# Patient Record
Sex: MALE | Race: WHITE | Employment: OTHER | URBAN - NONMETROPOLITAN AREA
[De-identification: names, ages, dates, MRNs, and addresses within clinical notes are randomized per-mention and may not be internally consistent; named-entity substitution may affect disease eponyms.]

---

## 2020-10-21 ENCOUNTER — HOSPITAL ENCOUNTER (EMERGENCY)
Age: 75
Discharge: HOME | End: 2020-10-21
Attending: EMERGENCY MEDICINE
Payer: COMMERCIAL

## 2020-10-21 VITALS
HEIGHT: 69 IN | RESPIRATION RATE: 16 BRPM | OXYGEN SATURATION: 98 % | DIASTOLIC BLOOD PRESSURE: 65 MMHG | WEIGHT: 248.1 LBS | TEMPERATURE: 98.4 F | BODY MASS INDEX: 36.75 KG/M2 | HEART RATE: 74 BPM | SYSTOLIC BLOOD PRESSURE: 111 MMHG

## 2020-10-21 DIAGNOSIS — R21 RASH AND OTHER NONSPECIFIC SKIN ERUPTION: Primary | ICD-10-CM

## 2020-10-21 PROCEDURE — 74011250636 HC RX REV CODE- 250/636: Performed by: EMERGENCY MEDICINE

## 2020-10-21 PROCEDURE — 99283 EMERGENCY DEPT VISIT LOW MDM: CPT

## 2020-10-21 PROCEDURE — 74011250637 HC RX REV CODE- 250/637: Performed by: EMERGENCY MEDICINE

## 2020-10-21 PROCEDURE — 96374 THER/PROPH/DIAG INJ IV PUSH: CPT

## 2020-10-21 RX ORDER — FAMOTIDINE 20 MG/1
20 TABLET, FILM COATED ORAL
Status: COMPLETED | OUTPATIENT
Start: 2020-10-21 | End: 2020-10-21

## 2020-10-21 RX ORDER — LOSARTAN POTASSIUM 25 MG/1
25 TABLET ORAL 2 TIMES DAILY
COMMUNITY

## 2020-10-21 RX ORDER — POTASSIUM CITRATE 15 MEQ/1
15 TABLET, EXTENDED RELEASE ORAL DAILY
COMMUNITY
End: 2022-05-12

## 2020-10-21 RX ORDER — HYDROCORTISONE 0.5 %
OINTMENT (GRAM) TOPICAL 3 TIMES DAILY
Qty: 1 TUBE | Refills: 0 | Status: SHIPPED | OUTPATIENT
Start: 2020-10-21 | End: 2020-10-31

## 2020-10-21 RX ORDER — LEVOTHYROXINE SODIUM 50 UG/1
50 TABLET ORAL
COMMUNITY
End: 2022-05-12 | Stop reason: SDUPTHER

## 2020-10-21 RX ORDER — CEPHALEXIN 500 MG/1
500 CAPSULE ORAL DAILY
COMMUNITY

## 2020-10-21 RX ORDER — ROSUVASTATIN CALCIUM 20 MG/1
20 TABLET, COATED ORAL DAILY
COMMUNITY
End: 2022-05-12 | Stop reason: SDUPTHER

## 2020-10-21 RX ORDER — CETIRIZINE HYDROCHLORIDE 10 MG/1
10 CAPSULE, LIQUID FILLED ORAL DAILY
Qty: 20 CAP | Refills: 0 | Status: SHIPPED | OUTPATIENT
Start: 2020-10-21 | End: 2022-02-23 | Stop reason: ALTCHOICE

## 2020-10-21 RX ORDER — ACETAMINOPHEN 160 MG/5ML
200 SUSPENSION, ORAL (FINAL DOSE FORM) ORAL DAILY
COMMUNITY

## 2020-10-21 RX ORDER — DIPHENHYDRAMINE HCL 25 MG
25 TABLET ORAL
Status: COMPLETED | OUTPATIENT
Start: 2020-10-21 | End: 2020-10-21

## 2020-10-21 RX ORDER — FAMOTIDINE 20 MG/1
20 TABLET, FILM COATED ORAL 2 TIMES DAILY
Qty: 20 TAB | Refills: 0 | Status: SHIPPED | OUTPATIENT
Start: 2020-10-21 | End: 2020-10-31

## 2020-10-21 RX ORDER — VALACYCLOVIR HYDROCHLORIDE 500 MG/1
500 TABLET, FILM COATED ORAL
COMMUNITY
End: 2022-05-27 | Stop reason: SDUPTHER

## 2020-10-21 RX ORDER — ESCITALOPRAM OXALATE 20 MG/1
20 TABLET ORAL DAILY
COMMUNITY
End: 2022-05-12 | Stop reason: SDUPTHER

## 2020-10-21 RX ORDER — GLUCOSAM/CHONDRO/HERB 149/HYAL 750-100 MG
1 TABLET ORAL DAILY
COMMUNITY

## 2020-10-21 RX ORDER — ASPIRIN 81 MG/1
81 TABLET ORAL DAILY
COMMUNITY

## 2020-10-21 RX ORDER — METFORMIN HYDROCHLORIDE 500 MG/1
500 TABLET ORAL 2 TIMES DAILY WITH MEALS
COMMUNITY
End: 2022-05-16 | Stop reason: SDUPTHER

## 2020-10-21 RX ORDER — COLESEVELAM 180 1/1
625 TABLET ORAL 2 TIMES DAILY WITH MEALS
COMMUNITY

## 2020-10-21 RX ORDER — PREGABALIN 100 MG/1
100 CAPSULE ORAL 3 TIMES DAILY
COMMUNITY
End: 2022-01-06 | Stop reason: ALTCHOICE

## 2020-10-21 RX ADMIN — METHYLPREDNISOLONE SODIUM SUCCINATE 125 MG: 125 INJECTION, POWDER, FOR SOLUTION INTRAMUSCULAR; INTRAVENOUS at 08:04

## 2020-10-21 RX ADMIN — FAMOTIDINE 20 MG: 20 TABLET, FILM COATED ORAL at 08:01

## 2020-10-21 RX ADMIN — DIPHENHYDRAMINE HYDROCHLORIDE 25 MG: 25 TABLET ORAL at 08:01

## 2020-10-21 NOTE — ED PROVIDER NOTES
Patient presents with complaint of rash on both sides with itching. He was exposed to pines yesterday. No shortness of breath. This was noticed this morning . No other complaints. Past Medical History:   Diagnosis Date    Diabetes ([address]     Hypercholesterolemia     Hypertension     Hypokalemia     Hypothyroidism     Kidney stones     Neuropathy     Prostatitis     Psychiatric disorder     depression/anxiety       Past Surgical History:   Procedure Laterality Date    HX CATARACT REMOVAL           History reviewed. No pertinent family history.     Social History     Socioeconomic History    Marital status: Not on file     Spouse name: Not on file    Number of children: Not on file    Years of education: Not on file    Highest education level: Not on file   Occupational History    Not on file   Social Needs    Financial resource strain: Not on file    Food insecurity     Worry: Not on file     Inability: Not on file    Transportation needs     Medical: Not on file     Non-medical: Not on file   Tobacco Use    Smoking status: Never Smoker    Smokeless tobacco: Never Used   Substance and Sexual Activity    Alcohol use: Not Currently    Drug use: Not Currently    Sexual activity: Not on file   Lifestyle    Physical activity     Days per week: Not on file     Minutes per session: Not on file    Stress: Not on file   Relationships    Social connections     Talks on phone: Not on file     Gets together: Not on file     Attends Jew service: Not on file     Active member of club or organization: Not on file     Attends meetings of clubs or organizations: Not on file     Relationship status: Not on file    Intimate partner violence     Fear of current or ex partner: Not on file     Emotionally abused: Not on file     Physically abused: Not on file     Forced sexual activity: Not on file   Other Topics Concern    Not on file   Social History Narrative    Not on file         ALLERGIES: Patient has no known allergies. Review of Systems   Constitutional: Negative. HENT: Negative. Eyes: Negative. Respiratory: Negative. Cardiovascular: Negative. Endocrine: Negative. Genitourinary: Negative. Musculoskeletal: Negative. Skin: Positive for rash. Allergic/Immunologic: Negative. Neurological: Negative. Hematological: Negative. Psychiatric/Behavioral: Negative. Vitals:    10/21/20 0716   BP: (!) 147/85   Pulse: 81   Resp: 16   Temp: 98.4 °F (36.9 °C)   SpO2: 98%   Weight: 112.5 kg (248 lb 1.6 oz)   Height: 5' 9\" (1.753 m)            Physical Exam  Vitals signs and nursing note reviewed. HENT:      Head: Normocephalic and atraumatic. Nose: Nose normal.      Mouth/Throat:      Mouth: Mucous membranes are moist.   Eyes:      Conjunctiva/sclera: Conjunctivae normal.      Pupils: Pupils are equal, round, and reactive to light. Neck:      Musculoskeletal: Normal range of motion and neck supple. Cardiovascular:      Rate and Rhythm: Normal rate and regular rhythm. Pulses: Normal pulses. Heart sounds: Normal heart sounds. Pulmonary:      Effort: Pulmonary effort is normal.      Breath sounds: Normal breath sounds. Abdominal:      General: Abdomen is flat. Bowel sounds are normal.      Palpations: Abdomen is soft. Musculoskeletal: Normal range of motion. Skin:     General: Skin is warm and dry. Findings: Rash present. Neurological:      General: No focal deficit present. Mental Status: He is oriented to person, place, and time. Mental status is at baseline.    Psychiatric:         Mood and Affect: Mood normal.         Behavior: Behavior normal.          MDM       Procedures

## 2021-10-03 ENCOUNTER — APPOINTMENT (OUTPATIENT)
Dept: CT IMAGING | Age: 76
End: 2021-10-03
Attending: EMERGENCY MEDICINE
Payer: COMMERCIAL

## 2021-10-03 ENCOUNTER — HOSPITAL ENCOUNTER (EMERGENCY)
Age: 76
Discharge: HOME | End: 2021-10-03
Attending: EMERGENCY MEDICINE
Payer: COMMERCIAL

## 2021-10-03 VITALS
RESPIRATION RATE: 18 BRPM | BODY MASS INDEX: 37.03 KG/M2 | HEART RATE: 68 BPM | TEMPERATURE: 97.9 F | SYSTOLIC BLOOD PRESSURE: 179 MMHG | WEIGHT: 250 LBS | HEIGHT: 69 IN | DIASTOLIC BLOOD PRESSURE: 93 MMHG | OXYGEN SATURATION: 98 %

## 2021-10-03 DIAGNOSIS — R10.9 ACUTE RIGHT FLANK PAIN: Primary | ICD-10-CM

## 2021-10-03 LAB
APPEARANCE UR: CLEAR
BACTERIA URNS QL MICRO: NEGATIVE /HPF
BILIRUB UR QL: NEGATIVE
COLOR UR: ABNORMAL
GLUCOSE UR STRIP.AUTO-MCNC: >1000 MG/DL
HGB UR QL STRIP: ABNORMAL
KETONES UR QL STRIP.AUTO: NEGATIVE MG/DL
LEUKOCYTE ESTERASE UR QL STRIP.AUTO: NEGATIVE
NITRITE UR QL STRIP.AUTO: NEGATIVE
PH UR STRIP: 5.5 [PH] (ref 5–8)
PROT UR STRIP-MCNC: NEGATIVE MG/DL
RBC #/AREA URNS HPF: NORMAL /HPF (ref 0–3)
SP GR UR REFRACTOMETRY: 1.02 (ref 1–1.03)
UROBILINOGEN UR QL STRIP.AUTO: 0.2 EU/DL (ref 0.2–1)
WBC URNS QL MICRO: NORMAL /HPF (ref 0–5)

## 2021-10-03 PROCEDURE — 81001 URINALYSIS AUTO W/SCOPE: CPT

## 2021-10-03 PROCEDURE — 74176 CT ABD & PELVIS W/O CONTRAST: CPT

## 2021-10-03 PROCEDURE — 99283 EMERGENCY DEPT VISIT LOW MDM: CPT

## 2021-10-03 PROCEDURE — 74011250637 HC RX REV CODE- 250/637: Performed by: EMERGENCY MEDICINE

## 2021-10-03 RX ORDER — TAMSULOSIN HYDROCHLORIDE 0.4 MG/1
0.4 CAPSULE ORAL ONCE
Status: COMPLETED | OUTPATIENT
Start: 2021-10-03 | End: 2021-10-03

## 2021-10-03 RX ORDER — IBUPROFEN 600 MG/1
600 TABLET ORAL
Status: COMPLETED | OUTPATIENT
Start: 2021-10-03 | End: 2021-10-03

## 2021-10-03 RX ORDER — TAMSULOSIN HYDROCHLORIDE 0.4 MG/1
0.4 CAPSULE ORAL DAILY
COMMUNITY
End: 2022-05-12

## 2021-10-03 RX ADMIN — TAMSULOSIN HYDROCHLORIDE 0.4 MG: 0.4 CAPSULE ORAL at 18:28

## 2021-10-03 RX ADMIN — IBUPROFEN 600 MG: 600 TABLET ORAL at 18:46

## 2021-10-03 NOTE — ED PROVIDER NOTES
EMERGENCY DEPARTMENT HISTORY AND PHYSICAL EXAM      Date: 10/3/2021  Patient Name: Chelsea Moser    History of Presenting Illness     Chief Complaint   Patient presents with    Flank Pain       History Provided By: Patient    HPI: Chelsea Moser, 78 y.o. male with a past medical history significant Previous kidney stones presents to the ED with cc of left flank pain for 2 days pain intensity 5/10 worsened with movement, patient stated he took his  Flomax today denies any fever chills no nausea vomiting diarrhea, declined pain medication at this time, patient came concerned today because his urine output had decreased    There are no other complaints, changes, or physical findings at this time. PCP: Ammon Polanco MD    No current facility-administered medications on file prior to encounter. Current Outpatient Medications on File Prior to Encounter   Medication Sig Dispense Refill    tamsulosin (FLOMAX) 0.4 mg capsule Take 0.4 mg by mouth daily.  insulin degludec (TRESIBA FLEXTOUCH U-100 SC) 30 Units by SubCUTAneous route nightly.  rosuvastatin (CRESTOR) 20 mg tablet Take 20 mg by mouth daily.  metFORMIN (GLUCOPHAGE) 500 mg tablet Take 500 mg by mouth two (2) times daily (with meals).  SITagliptin (Januvia) 100 mg tablet Take 100 mg by mouth daily.  escitalopram oxalate (LEXAPRO) 20 mg tablet Take 20 mg by mouth daily.  losartan (COZAAR) 25 mg tablet Take 25 mg by mouth two (2) times a day.  levothyroxine (SYNTHROID) 50 mcg tablet Take 50 mcg by mouth Daily (before breakfast).  pregabalin (LYRICA) 100 mg capsule Take 100 mg by mouth three (3) times daily.  dapagliflozin (Farxiga) 5 mg tab tablet Take 5 mg by mouth daily.  colesevelam (WELCHOL) 625 mg tablet Take 625 mg by mouth two (2) times daily (with meals).  potassium citrate (UROCIT-K) 15 mEq TbER tablet Take 15 mEq by mouth daily.       cephALEXin (KEFLEX) 500 mg capsule Take 500 mg by mouth daily.  valACYclovir (VALTREX) 500 mg tablet Take 500 mg by mouth two (2) times daily as needed.  coenzyme Q-10 (CO Q-10) 200 mg capsule Take 200 mg by mouth daily.  aspirin delayed-release 81 mg tablet Take 81 mg by mouth daily.  omega 3-DHA-EPA-fish oil (Fish Oil) 1,000 mg (120 mg-180 mg) capsule Take 1 Cap by mouth daily.  Cetirizine (ZyrTEC) 10 mg cap Take 10 mg by mouth daily. 20 Cap 0       Past History     Past Medical History:  Past Medical History:   Diagnosis Date    Diabetes [address] [address]     Hypercholesterolemia     Hypertension     Hypokalemia     Hypothyroidism     Kidney stones     Neuropathy     Prostatitis     Psychiatric disorder     depression/anxiety       Past Surgical History:  Past Surgical History:   Procedure Laterality Date    HX CATARACT REMOVAL         Family History:  No family history on file. Social History:  Social History     Tobacco Use    Smoking status: Never Smoker    Smokeless tobacco: Never Used   Substance Use Topics    Alcohol use: Not Currently    Drug use: Not Currently       Allergies:  No Known Allergies      Review of Systems     Review of Systems   Constitutional: Negative for chills and fever. HENT: Negative for rhinorrhea and sore throat. Eyes: Negative for pain and visual disturbance. Respiratory: Negative for cough and wheezing. Cardiovascular: Negative for chest pain and leg swelling. Gastrointestinal: Negative for abdominal pain and vomiting. Endocrine: Negative for polydipsia and polyuria. Genitourinary: Positive for decreased urine volume, difficulty urinating and flank pain. Musculoskeletal: Negative for back pain and neck pain. Skin: Negative for color change and pallor. Neurological: Negative for weakness and numbness. Psychiatric/Behavioral: Negative. Physical Exam     Physical Exam  Vitals and nursing note reviewed. Constitutional:       General: He is not in acute distress. Appearance: Normal appearance. He is not ill-appearing, toxic-appearing or diaphoretic. HENT:      Head: Normocephalic and atraumatic. Mouth/Throat:      Mouth: Mucous membranes are moist.      Pharynx: Oropharynx is clear. Eyes:      Conjunctiva/sclera: Conjunctivae normal.      Pupils: Pupils are equal, round, and reactive to light. Cardiovascular:      Rate and Rhythm: Normal rate and regular rhythm. Pulses: Normal pulses. Heart sounds: Normal heart sounds. Pulmonary:      Effort: Pulmonary effort is normal.      Breath sounds: Normal breath sounds. Abdominal:      General: Bowel sounds are normal.      Palpations: Abdomen is soft. Tenderness: There is no right CVA tenderness or left CVA tenderness. Musculoskeletal:         General: Tenderness present. No swelling, deformity or signs of injury. Cervical back: Normal, normal range of motion and neck supple. Thoracic back: Normal.      Lumbar back: Tenderness present. No deformity or bony tenderness. Back:    Skin:     General: Skin is warm and dry. Capillary Refill: Capillary refill takes less than 2 seconds. Neurological:      General: No focal deficit present. Mental Status: He is alert and oriented to person, place, and time. Psychiatric:         Mood and Affect: Mood normal.         Behavior: Behavior normal.         Lab and Diagnostic Study Results     Labs -   No results found for this or any previous visit (from the past 12 hour(s)). Radiologic Studies -   @lastxrresult@  CT Results  (Last 48 hours)    None        CXR Results  (Last 48 hours)    None            Medical Decision Making   - I am the first provider for this patient. - I reviewed the vital signs, available nursing notes, past medical history, past surgical history, family history and social history. - Initial assessment performed.  The patients presenting problems have been discussed, and they are in agreement with the care plan formulated and outlined with them. I have encouraged them to ask questions as they arise throughout their visit. Vital Signs-Reviewed the patient's vital signs. Patient Vitals for the past 12 hrs:   Temp Pulse Resp BP SpO2   10/03/21 1753 97.9 °F (36.6 °C) 68 18 (!) 179/93 99 %       Records Reviewed: Nursing Notes    The patient presents with back pain with a differential diagnosis of  kidney stone, lumbar strain, pneumonia and traumatic injury      ED Course:     ED Course as of Oct 03 1840   Sun Oct 03, 2021   1835 Patient also noted he has been doing some moving lifting heavy boxes over the last 3 days and he is on an extended regimen of Keflex that he takes daily    [SB]      ED Course User Index  [SB] Shawnee Benson MD       Provider Notes (Medical Decision Making): MDM       Procedures   Medical Decision Makingedical Decision Making  Performed by: Cathy Ontiveros MD  PROCEDURES:  Procedures       Disposition   Disposition: Condition stable and improved  DC- Adult Discharges: All of the diagnostic tests were reviewed and questions answered. Diagnosis, care plan and treatment options were discussed. The patient understands the instructions and will follow up as directed. The patients results have been reviewed with them. They have been counseled regarding their diagnosis. The patient verbally convey understanding and agreement of the signs, symptoms, diagnosis, treatment and prognosis and additionally agrees to follow up as recommended with their PCP in 24 - 48 hours. They also agree with the care-plan and convey that all of their questions have been answered.   I have also put together some discharge instructions for them that include: 1) educational information regarding their diagnosis, 2) how to care for their diagnosis at home, as well a 3) list of reasons why they would want to return to the ED prior to their follow-up appointment, should their condition change. DISCHARGE PLAN:  1. Current Discharge Medication List      CONTINUE these medications which have NOT CHANGED    Details   tamsulosin (FLOMAX) 0.4 mg capsule Take 0.4 mg by mouth daily. insulin degludec (TRESIBA FLEXTOUCH U-100 SC) 30 Units by SubCUTAneous route nightly. rosuvastatin (CRESTOR) 20 mg tablet Take 20 mg by mouth daily. metFORMIN (GLUCOPHAGE) 500 mg tablet Take 500 mg by mouth two (2) times daily (with meals). SITagliptin (Januvia) 100 mg tablet Take 100 mg by mouth daily. escitalopram oxalate (LEXAPRO) 20 mg tablet Take 20 mg by mouth daily. losartan (COZAAR) 25 mg tablet Take 25 mg by mouth two (2) times a day. levothyroxine (SYNTHROID) 50 mcg tablet Take 50 mcg by mouth Daily (before breakfast). pregabalin (LYRICA) 100 mg capsule Take 100 mg by mouth three (3) times daily. dapagliflozin (Farxiga) 5 mg tab tablet Take 5 mg by mouth daily. [facility]) 625 mg tablet Take 625 mg by mouth two (2) times daily (with meals). potassium citrate (UROCIT-K) 15 mEq TbER tablet Take 15 mEq by mouth daily. cephALEXin (KEFLEX) 500 mg capsule Take 500 mg by mouth daily. valACYclovir (VALTREX) 500 mg tablet Take 500 mg by mouth two (2) times daily as needed. coenzyme Q-10 (CO Q-10) 200 mg capsule Take 200 mg by mouth daily. aspirin delayed-release 81 mg tablet Take 81 mg by mouth daily. omega 3-DHA-EPA-fish oil (Fish Oil) 1,000 mg (120 mg-180 mg) capsule Take 1 Cap by mouth daily. Cetirizine (ZyrTEC) 10 mg cap Take 10 mg by mouth daily. Qty: 20 Cap, Refills: 0           2. Follow-up Information    None       3. Return to ED if worse   4. Current Discharge Medication List            Diagnosis     Clinical Impression: No diagnosis found. Attestations:    Norman Flores MD    Please note that this dictation was completed with [employer], the [employer] voice recognition software.   Quite often unanticipated grammatical, syntax, homophones, and other interpretive errors are inadvertently transcribed by the computer software. Please disregard these errors. Please excuse any errors that have escaped final proofreading. Thank you.

## 2021-10-03 NOTE — ED TRIAGE NOTES
Reports left flank pain that has been going on for 2 days, reports pain is worse when he is up moving around. Reports hx of kidney stones. Reports urine appears clear.

## 2022-01-06 ENCOUNTER — OFFICE VISIT (OUTPATIENT)
Dept: PRIMARY CARE CLINIC | Age: 77
End: 2022-01-06
Payer: COMMERCIAL

## 2022-01-06 VITALS
SYSTOLIC BLOOD PRESSURE: 127 MMHG | RESPIRATION RATE: 18 BRPM | BODY MASS INDEX: 36.18 KG/M2 | WEIGHT: 245 LBS | OXYGEN SATURATION: 98 % | TEMPERATURE: 97.2 F | DIASTOLIC BLOOD PRESSURE: 70 MMHG | HEART RATE: 82 BPM

## 2022-01-06 DIAGNOSIS — N40.0 BENIGN PROSTATIC HYPERPLASIA WITHOUT LOWER URINARY TRACT SYMPTOMS: ICD-10-CM

## 2022-01-06 DIAGNOSIS — E03.9 ACQUIRED HYPOTHYROIDISM: ICD-10-CM

## 2022-01-06 DIAGNOSIS — Z79.4 TYPE 2 DIABETES MELLITUS WITH DIABETIC NEUROPATHY, WITH LONG-TERM CURRENT USE OF INSULIN (HCC): Primary | ICD-10-CM

## 2022-01-06 DIAGNOSIS — E11.40 TYPE 2 DIABETES MELLITUS WITH DIABETIC NEUROPATHY, WITH LONG-TERM CURRENT USE OF INSULIN (HCC): Primary | ICD-10-CM

## 2022-01-06 DIAGNOSIS — F41.9 ANXIETY AND DEPRESSION: ICD-10-CM

## 2022-01-06 DIAGNOSIS — G62.9 NEUROPATHY: ICD-10-CM

## 2022-01-06 DIAGNOSIS — E11.69 HYPERLIPIDEMIA DUE TO TYPE 2 DIABETES MELLITUS (HCC): ICD-10-CM

## 2022-01-06 DIAGNOSIS — Z86.79 HISTORY OF HEART DISEASE: ICD-10-CM

## 2022-01-06 DIAGNOSIS — E66.01 SEVERE OBESITY (BMI 35.0-35.9 WITH COMORBIDITY) (HCC): ICD-10-CM

## 2022-01-06 DIAGNOSIS — B00.9 HERPES: ICD-10-CM

## 2022-01-06 DIAGNOSIS — Z86.010 HISTORY OF COLON POLYPS: ICD-10-CM

## 2022-01-06 DIAGNOSIS — E78.5 HYPERLIPIDEMIA DUE TO TYPE 2 DIABETES MELLITUS (HCC): ICD-10-CM

## 2022-01-06 DIAGNOSIS — F32.A ANXIETY AND DEPRESSION: ICD-10-CM

## 2022-01-06 PROBLEM — N12 PYELONEPHRITIS: Status: ACTIVE | Noted: 2018-12-25

## 2022-01-06 PROBLEM — I10 ESSENTIAL HYPERTENSION: Status: ACTIVE | Noted: 2018-12-26

## 2022-01-06 PROBLEM — E11.9 DM (DIABETES MELLITUS) (HCC): Status: ACTIVE | Noted: 2018-12-26

## 2022-01-06 PROBLEM — N20.0 KIDNEY STONES: Status: ACTIVE | Noted: 2020-08-24

## 2022-01-06 PROBLEM — N12 PYELONEPHRITIS: Status: RESOLVED | Noted: 2018-12-25 | Resolved: 2022-01-06

## 2022-01-06 PROBLEM — E78.2 MIXED HYPERLIPIDEMIA: Status: ACTIVE | Noted: 2020-08-24

## 2022-01-06 LAB — HBA1C MFR BLD HPLC: 7.5 %

## 2022-01-06 PROCEDURE — 83036 HEMOGLOBIN GLYCOSYLATED A1C: CPT | Performed by: NURSE PRACTITIONER

## 2022-01-06 PROCEDURE — 99214 OFFICE O/P EST MOD 30 MIN: CPT | Performed by: NURSE PRACTITIONER

## 2022-01-06 PROCEDURE — 3051F HG A1C>EQUAL 7.0%<8.0%: CPT | Performed by: NURSE PRACTITIONER

## 2022-01-06 RX ORDER — PREGABALIN 200 MG/1
200 CAPSULE ORAL 3 TIMES DAILY
Qty: 270 CAPSULE | Refills: 0 | Status: SHIPPED | OUTPATIENT
Start: 2022-01-06 | End: 2022-02-23

## 2022-01-06 RX ORDER — PREGABALIN 200 MG/1
400 CAPSULE ORAL 3 TIMES DAILY
Status: CANCELLED | OUTPATIENT
Start: 2022-01-06

## 2022-01-06 RX ORDER — INSULIN DEGLUDEC INJECTION 100 U/ML
30 INJECTION, SOLUTION SUBCUTANEOUS
Qty: 27 ML | Refills: 0 | Status: SHIPPED | OUTPATIENT
Start: 2022-01-06 | End: 2022-03-27

## 2022-01-06 RX ORDER — PREGABALIN 200 MG/1
400 CAPSULE ORAL 3 TIMES DAILY
COMMUNITY
End: 2022-01-06 | Stop reason: SDUPTHER

## 2022-01-06 RX ORDER — METOPROLOL SUCCINATE 25 MG/1
TABLET, EXTENDED RELEASE ORAL
COMMUNITY
Start: 2021-11-10

## 2022-01-06 RX ORDER — PEN NEEDLE, DIABETIC 31 GX5/16"
NEEDLE, DISPOSABLE MISCELLANEOUS
COMMUNITY
Start: 2021-11-10 | End: 2022-05-16 | Stop reason: SDUPTHER

## 2022-01-06 RX ORDER — INSULIN DEGLUDEC INJECTION 100 U/ML
INJECTION, SOLUTION SUBCUTANEOUS
Status: CANCELLED | OUTPATIENT
Start: 2022-01-06

## 2022-01-06 RX ORDER — INSULIN GLARGINE 100 [IU]/ML
30 INJECTION, SOLUTION SUBCUTANEOUS AT BEDTIME
COMMUNITY
Start: 2021-11-10 | End: 2022-01-06 | Stop reason: ALTCHOICE

## 2022-01-06 NOTE — PROGRESS NOTES
Chief Complaint   Patient presents with    Diabetes    Hypertension     Pt just moved here need snew referrals

## 2022-01-06 NOTE — PROGRESS NOTES
Alysha Rivas is a 78 y.o. male who presents to the office today for the following:    Chief Complaint   Patient presents with    Diabetes    Hypertension       Past Medical History:   Diagnosis Date    Diabetes ([address]     Hypercholesterolemia     Hypertension     Hypokalemia     Hypothyroidism     Kidney stones     Neuropathy     Prostatitis     Psychiatric disorder     depression/anxiety       Past Surgical History:   Procedure Laterality Date    HX CATARACT REMOVAL      HX LITHOTRIPSY          History reviewed. No pertinent family history. Social History     Tobacco Use    Smoking status: Never Smoker    Smokeless tobacco: Never Used   Substance Use Topics    Alcohol use: Not Currently    Drug use: Not Currently        HPI  Patient here today as new patient to establish care with [facility] of type 2 diabetes, hyperlipidemia, heart disease, BPH, neuropathy, hypothyroidism, herpes,anxiety/depression,colon polyps and obesity. Reports he recently moved to area. Was following with multiple specialist including urologist and cardiologist. Denies any new concerns today but does need refills on few medications and labwork. Current Outpatient Medications on File Prior to Visit   Medication Sig    BD Ultra-Fine Short Pen Needle 31 gauge x 5/16\" ndle     metoprolol succinate (TOPROL-XL) 25 mg XL tablet     tamsulosin (FLOMAX) 0.4 mg capsule Take 0.4 mg by mouth daily.  rosuvastatin (CRESTOR) 20 mg tablet Take 20 mg by mouth daily.  metFORMIN (GLUCOPHAGE) 500 mg tablet Take 500 mg by mouth two (2) times daily (with meals).  SITagliptin (Januvia) 100 mg tablet Take 100 mg by mouth daily.  escitalopram oxalate (LEXAPRO) 20 mg tablet Take 20 mg by mouth daily.  losartan (COZAAR) 25 mg tablet Take 25 mg by mouth two (2) times a day.  levothyroxine (SYNTHROID) 50 mcg tablet Take 50 mcg by mouth Daily (before breakfast).     colesevelam (WELCHOL) 625 mg tablet Take 625 mg by mouth two (2) times daily (with meals).  potassium citrate (UROCIT-K) 15 mEq TbER tablet Take 15 mEq by mouth daily.  cephALEXin (KEFLEX) 500 mg capsule Take 500 mg by mouth daily.  valACYclovir (VALTREX) 500 mg tablet Take 500 mg by mouth two (2) times daily as needed.  coenzyme Q-10 (CO Q-10) 200 mg capsule Take 200 mg by mouth daily.  aspirin delayed-release 81 mg tablet Take 81 mg by mouth daily.  omega 3-DHA-EPA-fish oil (Fish Oil) 1,000 mg (120 mg-180 mg) capsule Take 1 Capsule by mouth daily. OTC    Cetirizine (ZyrTEC) 10 mg cap Take 10 mg by mouth daily. (Patient not taking: Reported on 2022)     No current facility-administered medications on file prior to visit. Medications Ordered Today   Medications    dapagliflozin (Farxiga) 5 mg tab tablet     Sig: Take 1 Tablet by mouth daily. Dispense:  90 Tablet     Refill:  0    pregabalin (LYRICA) 200 mg capsule     Sig: Take 1 Capsule by mouth three (3) times daily for 90 days. Max Daily Amount: 600 mg. Dispense:  270 Capsule     Refill:  0    insulin degludec Durwin Alejandra FlexTouch U-100) 100 unit/mL (3 mL) inpn     Si Units by SubCUTAneous route nightly for 90 days. Dispense:  27 mL     Refill:  0        Review of Systems   Constitutional: Negative. HENT: Negative. Eyes: Negative. Respiratory: Negative. Cardiovascular: Negative. Gastrointestinal: Negative. Genitourinary: Negative. Musculoskeletal: Positive for myalgias. Skin: Negative. Neurological: Positive for tingling. Negative for dizziness, focal weakness, seizures, weakness and headaches. Psychiatric/Behavioral: Negative. Visit Vitals  /70 (BP 1 Location: Left upper arm, BP Patient Position: Sitting, BP Cuff Size: Adult)   Pulse 82   Temp 97.2 °F (36.2 °C) (Temporal)   Resp 18   Wt 245 lb (111.1 kg)   SpO2 98%   BMI 36.18 kg/m²       Physical Exam  Vitals and nursing note reviewed.    Constitutional:       Appearance: Normal appearance. He is obese. Eyes:      Pupils: Pupils are equal, round, and reactive to light. Neck:      Vascular: No carotid bruit. Cardiovascular:      Rate and Rhythm: Normal rate and regular rhythm. Pulses: Normal pulses. Pulmonary:      Effort: Pulmonary effort is normal.      Breath sounds: Normal breath sounds. Abdominal:      General: Bowel sounds are normal.      Palpations: Abdomen is soft. Tenderness: There is no abdominal tenderness. There is no guarding. Musculoskeletal:         General: Normal range of motion. Right lower leg: No edema. Left lower leg: No edema. Lymphadenopathy:      Cervical: No cervical adenopathy. Skin:     General: Skin is warm and dry. Neurological:      Mental Status: He is alert and oriented to person, place, and time. Mental status is at baseline. 1. Type 2 diabetes mellitus with diabetic neuropathy, with long-term current use of insulin ([facility])  Last Point of Care HGB A1C  Hemoglobin A1c (POC)   Date Value Ref Range Status   01/06/2022 7.5 % Preliminary      Sugars are controlled   On Farxiga 5mg daily, Januvia 100mg daily, metformin 500mg twice daily (highest tolerated dose) and tresiba 30 units daily  Did not bring meter but reports fastings under 150  Up to date on diabetic eye exam per patient  Will check fasting labs  Diabetic diet encouraged along with getting regular exercise 3-5 times weekly for 30-45 minutes  - dapagliflozin (Farxiga) 5 mg tab tablet; Take 1 Tablet by mouth daily. Dispense: 90 Tablet; Refill: 0  - AMB POC HEMOGLOBIN A1C  - CBC WITH AUTOMATED DIFF  - METABOLIC PANEL, COMPREHENSIVE  - URINALYSIS W/ RFLX MICROSCOPIC  - MICROALBUMIN, UR, RAND W/ MICROALB/CREAT RATIO  - LIPID PANEL  - TSH RFX ON ABNORMAL TO FREE T4  - pregabalin (LYRICA) 200 mg capsule; Take 1 Capsule by mouth three (3) times daily for 90 days. Max Daily Amount: 600 mg. Dispense: 270 Capsule;  Refill: 0  - insulin degludec Rico Wheatleyuch U-100) 100 unit/mL (3 mL) inpn; 30 Units by SubCUTAneous route nightly for 90 days. Dispense: 27 mL; Refill: 0    2. Severe obesity (BMI 35.0-35.9 with comorbidity) ([facility] [address]  Continue to encourage weight loss with diet and exercise as discussed above    3. Hyperlipidemia due to type 2 diabetes mellitus ([facility] [address]  Has been stable on crestor 20mg daily    4. Benign prostatic hyperplasia without lower urinary tract symptoms  On flomax but does still have some urinary symptoms  Checking PSA  Will send referral to establish care with new urologist  - PSA W/ REFLX FREE PSA  - REFERRAL TO UROLOGY    5. Herpes  Stable and on valtrex prophylaxis    6. Anxiety and depression  Stable and on lexapro as directed    7. History of colon polyps    - REFERRAL TO GASTROENTEROLOGY  - COLONOSCOPY; Future    8. History of heart disease  He reports h/o cardiac problem but no records available today  Will send referral to establish care with local cardiologist and requested for him to sign release for prior records. - REFERRAL TO CARDIOLOGY    9. Neuropathy  Continue on lyrica as directed for pain associated with neuropathy in hands and feet  dvised do not drive or operate machinery if experiencing drowsiness or otherwise feel impaired while taking medication. ORT score 2 and low risk   Controlled substance agreement discussed and signed   Understands proper use, storage and disposal of medication   Controlled Substance Monitoring:    RX Monitoring 1/6/2022   Periodic Controlled Substance Monitoring Possible medication side effects, risk of tolerance/dependence & alternative treatments discussed. ;No signs of potential drug abuse or diversion identified. 10.Hypothyroidism  On levothyroxine 50mcq daily   Repeat thyroid functions with labs    Patient verbalizes understanding of plan of care as discussed above    Follow-up and Dispositions    · Return in about 6 months (around 7/6/2022) for or sooner for worsening symptoms.

## 2022-01-08 LAB
ALBUMIN SERPL-MCNC: 4.7 G/DL (ref 3.7–4.7)
ALBUMIN/CREAT UR: 74 MG/G CREAT (ref 0–29)
ALBUMIN/GLOB SERPL: 2.1 {RATIO} (ref 1.2–2.2)
ALP SERPL-CCNC: 57 IU/L (ref 44–121)
ALT SERPL-CCNC: 18 IU/L (ref 0–44)
APPEARANCE UR: CLEAR
AST SERPL-CCNC: 12 IU/L (ref 0–40)
BACTERIA #/AREA URNS HPF: NORMAL /[HPF]
BASOPHILS # BLD AUTO: 0.1 X10E3/UL (ref 0–0.2)
BASOPHILS NFR BLD AUTO: 1 %
BILIRUB SERPL-MCNC: 1 MG/DL (ref 0–1.2)
BILIRUB UR QL STRIP: NEGATIVE
BUN SERPL-MCNC: 16 MG/DL (ref 8–27)
BUN/CREAT SERPL: 13 (ref 10–24)
CALCIUM SERPL-MCNC: 9.5 MG/DL (ref 8.6–10.2)
CASTS URNS QL MICRO: NORMAL /LPF
CHLORIDE SERPL-SCNC: 101 MMOL/L (ref 96–106)
CHOLEST SERPL-MCNC: 78 MG/DL (ref 100–199)
CO2 SERPL-SCNC: 23 MMOL/L (ref 20–29)
COLOR UR: YELLOW
CREAT SERPL-MCNC: 1.2 MG/DL (ref 0.76–1.27)
CREAT UR-MCNC: 154.1 MG/DL
EOSINOPHIL # BLD AUTO: 0.2 X10E3/UL (ref 0–0.4)
EOSINOPHIL NFR BLD AUTO: 3 %
EPI CELLS #/AREA URNS HPF: NORMAL /HPF (ref 0–10)
ERYTHROCYTE [DISTWIDTH] IN BLOOD BY AUTOMATED COUNT: 13.3 % (ref 11.6–15.4)
GLOBULIN SER CALC-MCNC: 2.2 G/DL (ref 1.5–4.5)
GLUCOSE SERPL-MCNC: 112 MG/DL (ref 65–99)
GLUCOSE UR QL: NEGATIVE
HCT VFR BLD AUTO: 49.1 % (ref 37.5–51)
HDLC SERPL-MCNC: 35 MG/DL
HGB BLD-MCNC: 16.7 G/DL (ref 13–17.7)
HGB UR QL STRIP: ABNORMAL
IMM GRANULOCYTES # BLD AUTO: 0 X10E3/UL (ref 0–0.1)
IMM GRANULOCYTES NFR BLD AUTO: 0 %
KETONES UR QL STRIP: NEGATIVE
LDLC SERPL CALC-MCNC: 19 MG/DL (ref 0–99)
LEUKOCYTE ESTERASE UR QL STRIP: NEGATIVE
LYMPHOCYTES # BLD AUTO: 2.6 X10E3/UL (ref 0.7–3.1)
LYMPHOCYTES NFR BLD AUTO: 34 %
MCH RBC QN AUTO: 30.2 PG (ref 26.6–33)
MCHC RBC AUTO-ENTMCNC: 34 G/DL (ref 31.5–35.7)
MCV RBC AUTO: 89 FL (ref 79–97)
MICRO URNS: ABNORMAL
MICROALBUMIN UR-MCNC: 114.2 UG/ML
MONOCYTES # BLD AUTO: 0.5 X10E3/UL (ref 0.1–0.9)
MONOCYTES NFR BLD AUTO: 7 %
NEUTROPHILS # BLD AUTO: 4.1 X10E3/UL (ref 1.4–7)
NEUTROPHILS NFR BLD AUTO: 55 %
NITRITE UR QL STRIP: NEGATIVE
PH UR STRIP: 5.5 [PH] (ref 5–7.5)
PLATELET # BLD AUTO: 175 X10E3/UL (ref 150–450)
POTASSIUM SERPL-SCNC: 4.8 MMOL/L (ref 3.5–5.2)
PROT SERPL-MCNC: 6.9 G/DL (ref 6–8.5)
PROT UR QL STRIP: ABNORMAL
PSA SERPL-MCNC: 0.3 NG/ML (ref 0–4)
RBC # BLD AUTO: 5.53 X10E6/UL (ref 4.14–5.8)
RBC #/AREA URNS HPF: NORMAL /HPF (ref 0–2)
REFLEX CRITERIA: NORMAL
SODIUM SERPL-SCNC: 139 MMOL/L (ref 134–144)
SP GR UR: 1.02 (ref 1–1.03)
TRIGL SERPL-MCNC: 135 MG/DL (ref 0–149)
TSH SERPL DL<=0.005 MIU/L-ACNC: 2.45 UIU/ML (ref 0.45–4.5)
UROBILINOGEN UR STRIP-MCNC: 0.2 MG/DL (ref 0.2–1)
VLDLC SERPL CALC-MCNC: 24 MG/DL (ref 5–40)
WBC # BLD AUTO: 7.6 X10E3/UL (ref 3.4–10.8)
WBC #/AREA URNS HPF: NORMAL /HPF (ref 0–5)

## 2022-01-13 PROBLEM — B00.9 HERPES: Status: ACTIVE | Noted: 2022-01-13

## 2022-01-13 PROBLEM — N40.0 BENIGN PROSTATIC HYPERPLASIA WITHOUT LOWER URINARY TRACT SYMPTOMS: Status: ACTIVE | Noted: 2022-01-13

## 2022-01-13 PROBLEM — Z86.010 HISTORY OF COLON POLYPS: Status: ACTIVE | Noted: 2022-01-13

## 2022-01-13 PROBLEM — G62.9 NEUROPATHY: Status: ACTIVE | Noted: 2022-01-13

## 2022-01-13 PROBLEM — E66.01 SEVERE OBESITY (BMI 35.0-35.9 WITH COMORBIDITY) (HCC): Status: ACTIVE | Noted: 2022-01-13

## 2022-02-22 NOTE — PROGRESS NOTES
HISTORY OF PRESENT ILLNESS  Fortunato Disla is a 78 y.o. male is here for follow up on BPH. He is On flomax but does still have some urinary symptoms. He was referred by PCP. History of uric acid stones on right side(stent placement and laser)   He has a history of UTI with symptoms of burning frequency. He was put on Keflex in Oct,2019 and since then had no UTI infection. The previous urologist checked his PSA and as per patient it was normal.  Patient was here over 45 minutes. We discussed his stone history. We discussed his potassium citrate history. We discussed following his kidneys with ultrasound to be sure uric acid stones are not coming back. We talked about his Keflex suppression prevent infections which is worked for him well. We talked about renewing his tamsulosin for his urinary symptoms he says that has made a huge difference in the way that he voids . I am taking over his care after he has been taking care by Dr. Loraine David for years who has done an excellent job, patient is a former is  with moved down here from [facility]. HPI  Chronic Conditions Addressed Today     1. Kidney stones - Primary     Relevant Medications     potassium citrate (UROCIT-K) 15 mEq TbER tablet     tamsulosin (Flomax) 0.4 mg capsule     Other Relevant Orders     AMB POC URINALYSIS DIP STICK AUTO W/O MICRO     US RETROPERITONEUM LTD    2. BPH with obstruction/lower urinary tract symptoms     Relevant Medications     potassium citrate (UROCIT-K) 15 mEq TbER tablet     tamsulosin (Flomax) 0.4 mg capsule    3. Urinary tract infection without hematuria     Relevant Medications     potassium citrate (UROCIT-K) 15 mEq TbER tablet     tamsulosin (Flomax) 0.4 mg capsule     cephALEXin (KEFLEX) 500 mg capsule    4. Slow urinary stream     Relevant Medications     potassium citrate (UROCIT-K) 15 mEq TbER tablet     tamsulosin (Flomax) 0.4 mg capsule    5.  Uricosuria     Relevant Medications     potassium citrate (UROCIT-K) 15 mEq TbER tablet     tamsulosin (Flomax) 0.4 mg capsule    6. Prescription refill        Patient denies the symptoms of COVID-19 per routine screening guidelines. Review of Systems   Constitutional: Negative. HENT: Negative. Eyes: Negative. Respiratory: Negative. Cardiovascular: Negative. Gastrointestinal: Negative. Musculoskeletal: Negative. Skin: Negative. Neurological: Negative. Endo/Heme/Allergies: Negative. Psychiatric/Behavioral: Negative. Past Medical History:  PMHx (including negatives):  has a past medical history of Diabetes ([address] Hypercholesterolemia, Hypertension, Hypokalemia, Hypothyroidism, Kidney stones, Neuropathy, Prostatitis, and Psychiatric disorder. PSurgHx:  has a past surgical history that includes hx cataract removal and hx lithotripsy. PSocHx:  reports that he has never smoked. He has never used smokeless tobacco. He reports previous alcohol use. He reports previous drug use. Home Medications    Medication Sig Start Date End Date Taking? Authorizing Provider   pregabalin (LYRICA) 200 mg capsule TAKE 1 CAPSULE 3 TIMES A   DAY. MAXIMUM DAILY DOSE:   600MG 2/23/22  Yes Danielle Marlow NP   Scot [place] 5 mg tab tablet TAKE 1 TABLET DAILY 2/23/22  Yes Danielle Marlow NP   potassium citrate (UROCIT-K) 15 mEq TbER tablet Take 1 Tablet by mouth daily. Take 1 tab  By mouth daily 2/23/22  Yes Mylene Quintana NP   tamsulosin (Flomax) 0.4 mg capsule Take 1 Capsule by mouth daily. 2/23/22  Yes Mylene Quintana NP   cephALEXin (KEFLEX) 500 mg capsule Take 1 Capsule by mouth once over twenty-four (24) hours.  Take 1 tab po once daily 2/23/22  Yes Mylene Quintana NP   BD Ultra-Fine Short Pen Needle 31 gauge x 5/16\" ndle  11/10/21  Yes Provider, Historical   metoprolol succinate (TOPROL-XL) 25 mg XL tablet  11/10/21  Yes Provider, Historical   insulin degludec Rico Sánchez FlexTouch U-100) 100 unit/mL (3 mL) inpn 30 Units by SubCUTAneous route nightly for 90 days. 1/6/22 4/6/22 Yes Arneta Najjar, NP   tamsulosin (FLOMAX) 0.4 mg capsule Take 0.4 mg by mouth daily. Yes Other, MD Frantz   rosuvastatin (CRESTOR) 20 mg tablet Take 20 mg by mouth daily. Yes Other, MD Frantz   metFORMIN (GLUCOPHAGE) 500 mg tablet Take 500 mg by mouth two (2) times daily (with meals). Yes Other, MD Frantz   SITagliptin (Januvia) 100 mg tablet Take 100 mg by mouth daily. Yes Other, MD Frantz   escitalopram oxalate (LEXAPRO) 20 mg tablet Take 20 mg by mouth daily. Yes Ramya, MD Frantz   losartan (COZAAR) 25 mg tablet Take 25 mg by mouth two (2) times a day. Yes Other, MD Frantz   levothyroxine (SYNTHROID) 50 mcg tablet Take 50 mcg by mouth Daily (before breakfast). Yes Other, MD Frantz   potassium citrate (UROCIT-K) 15 mEq TbER tablet Take 15 mEq by mouth daily. Yes Other, MD Frantz   cephALEXin (KEFLEX) 500 mg capsule Take 500 mg by mouth daily. Yes Ramya, MD Frantz   valACYclovir (VALTREX) 500 mg tablet Take 500 mg by mouth two (2) times daily as needed. Yes Other, MD Frantz   coenzyme Q-10 (CO Q-10) 200 mg capsule Take 200 mg by mouth daily. Yes Other, MD Frantz   aspirin delayed-release 81 mg tablet Take 81 mg by mouth daily. Yes Other, MD Frantz   omega 3-DHA-EPA-fish oil (Fish Oil) 1,000 mg (120 mg-180 mg) capsule Take 1 Capsule by mouth daily. OTC   Yes Other, MD Frantz   colesevelam (WELCHOL) 625 mg tablet Take 625 mg by mouth two (2) times daily (with meals). Other, MD Frantz      Physical Exam  Vitals and nursing note reviewed. Constitutional:       Appearance: Normal appearance. He is obese. HENT:      Head: Normocephalic. Nose: Nose normal.      Mouth/Throat:      Mouth: Mucous membranes are moist.   Eyes:      Pupils: Pupils are equal, round, and reactive to light. Cardiovascular:      Rate and Rhythm: Normal rate and regular rhythm.    Pulmonary:      Effort: Pulmonary effort is normal.   Abdominal: General: Abdomen is flat. Palpations: Abdomen is soft. Genitourinary:     Comments: Deferred for now  Musculoskeletal:         General: Normal range of motion. Cervical back: Normal range of motion. Skin:     General: Skin is warm. Neurological:      General: No focal deficit present. Mental Status: He is alert and oriented to person, place, and time. Psychiatric:         Mood and Affect: Mood normal.         Behavior: Behavior normal.         Thought Content: Thought content normal.         Judgment: Judgment normal.         ASSESSMENT and PLAN  Diagnoses and all orders for this visit:    1. Kidney stones  -     AMB POC URINALYSIS DIP STICK AUTO W/O MICRO  -     US RETROPERITONEUM LTD; Future    2. Urinary tract infection without hematuria, site unspecified  Patient on Keflex for prophylaxis. No UTI infection since 2019    Other orders  -     potassium citrate (UROCIT-K) 15 mEq TbER tablet; Take 1 Tablet by mouth daily. Take 1 tab  By mouth daily  -     tamsulosin (Flomax) 0.4 mg capsule; Take 1 Capsule by mouth daily. -     cephALEXin (KEFLEX) 500 mg capsule; Take 1 Capsule by mouth once over twenty-four (24) hours. Take 1 tab po once daily         1. Kidney stones  US of kidneys due to uric acid stones  RX refill      Scott Bradford MD

## 2022-02-23 ENCOUNTER — OFFICE VISIT (OUTPATIENT)
Dept: UROLOGY | Age: 77
End: 2022-02-23
Payer: COMMERCIAL

## 2022-02-23 VITALS
WEIGHT: 245 LBS | HEART RATE: 78 BPM | HEIGHT: 69 IN | SYSTOLIC BLOOD PRESSURE: 152 MMHG | DIASTOLIC BLOOD PRESSURE: 84 MMHG | BODY MASS INDEX: 36.29 KG/M2 | TEMPERATURE: 97.1 F | OXYGEN SATURATION: 98 %

## 2022-02-23 DIAGNOSIS — Z76.0 PRESCRIPTION REFILL: ICD-10-CM

## 2022-02-23 DIAGNOSIS — N39.0 URINARY TRACT INFECTION WITHOUT HEMATURIA, SITE UNSPECIFIED: ICD-10-CM

## 2022-02-23 DIAGNOSIS — E11.40 TYPE 2 DIABETES MELLITUS WITH DIABETIC NEUROPATHY, WITH LONG-TERM CURRENT USE OF INSULIN (HCC): ICD-10-CM

## 2022-02-23 DIAGNOSIS — Z79.4 TYPE 2 DIABETES MELLITUS WITH DIABETIC NEUROPATHY, WITH LONG-TERM CURRENT USE OF INSULIN (HCC): ICD-10-CM

## 2022-02-23 DIAGNOSIS — N13.8 BPH WITH OBSTRUCTION/LOWER URINARY TRACT SYMPTOMS: ICD-10-CM

## 2022-02-23 DIAGNOSIS — N20.0 KIDNEY STONES: Primary | ICD-10-CM

## 2022-02-23 DIAGNOSIS — R82.998 URICOSURIA: ICD-10-CM

## 2022-02-23 DIAGNOSIS — N40.1 BPH WITH OBSTRUCTION/LOWER URINARY TRACT SYMPTOMS: ICD-10-CM

## 2022-02-23 DIAGNOSIS — R39.198 SLOW URINARY STREAM: ICD-10-CM

## 2022-02-23 LAB
BILIRUB UR QL: NEGATIVE
GLUCOSE UR-MCNC: 1000 MG/DL
KETONES P FAST UR STRIP-MCNC: NEGATIVE MG/DL
PH UR STRIP: 5 [PH] (ref 4.6–8)
PROT UR QL STRIP: NEGATIVE
SP GR UR STRIP: 1.01 (ref 1–1.03)
UA UROBILINOGEN AMB POC: NORMAL (ref 0.2–1)
URINALYSIS CLARITY POC: CLEAR
URINALYSIS COLOR POC: YELLOW
URINE BLOOD POC: NORMAL
URINE LEUKOCYTES POC: NEGATIVE
URINE NITRITES POC: NEGATIVE

## 2022-02-23 PROCEDURE — 81003 URINALYSIS AUTO W/O SCOPE: CPT | Performed by: UROLOGY

## 2022-02-23 PROCEDURE — 99204 OFFICE O/P NEW MOD 45 MIN: CPT | Performed by: UROLOGY

## 2022-02-23 RX ORDER — PREGABALIN 200 MG/1
CAPSULE ORAL
Qty: 270 CAPSULE | Refills: 0 | Status: SHIPPED | OUTPATIENT
Start: 2022-02-23 | End: 2022-07-19

## 2022-02-23 RX ORDER — CEPHALEXIN 500 MG/1
500 CAPSULE ORAL
Qty: 90 CAPSULE | Refills: 3 | Status: SHIPPED | OUTPATIENT
Start: 2022-02-23 | End: 2022-05-12

## 2022-02-23 RX ORDER — DAPAGLIFLOZIN 5 MG/1
TABLET, FILM COATED ORAL
Qty: 90 TABLET | Refills: 0 | Status: SHIPPED | OUTPATIENT
Start: 2022-02-23 | End: 2022-05-19

## 2022-02-23 RX ORDER — POTASSIUM CITRATE 15 MEQ/1
15 TABLET, EXTENDED RELEASE ORAL DAILY
Qty: 90 TABLET | Refills: 3 | Status: SHIPPED | OUTPATIENT
Start: 2022-02-23

## 2022-02-23 RX ORDER — TAMSULOSIN HYDROCHLORIDE 0.4 MG/1
0.4 CAPSULE ORAL DAILY
Qty: 90 CAPSULE | Refills: 3 | Status: SHIPPED | OUTPATIENT
Start: 2022-02-23

## 2022-02-23 NOTE — PROGRESS NOTES
Chief Complaint   Patient presents with    New Patient     ros    Benign Prostatic Hypertrophy     1. Have you been to the ER, urgent care clinic since your last visit? Hospitalized since your last visit? No    2. Have you seen or consulted any other health care providers outside of the [address] [place] [address] since your last visit? Include any pap smears or colon screening.  No   Visit Vitals  BP (!) 152/84 (BP 1 Location: Right upper arm, BP Patient Position: Sitting, BP Cuff Size: Adult)   Pulse 78   Temp 97.1 °F (36.2 °C) (Temporal)   Ht 5' 9\" (1.753 m)   Wt 245 lb (111.1 kg)   SpO2 98%   BMI 36.18 kg/m²

## 2022-02-24 ENCOUNTER — OFFICE VISIT (OUTPATIENT)
Dept: GASTROENTEROLOGY | Age: 77
End: 2022-02-24
Payer: COMMERCIAL

## 2022-02-24 VITALS
SYSTOLIC BLOOD PRESSURE: 140 MMHG | HEART RATE: 80 BPM | RESPIRATION RATE: 14 BRPM | BODY MASS INDEX: 37.62 KG/M2 | DIASTOLIC BLOOD PRESSURE: 80 MMHG | WEIGHT: 254 LBS | HEIGHT: 69 IN | OXYGEN SATURATION: 97 % | TEMPERATURE: 97.1 F

## 2022-02-24 DIAGNOSIS — Z86.010 HISTORY OF COLON POLYPS: Primary | ICD-10-CM

## 2022-02-24 DIAGNOSIS — Z79.4 TYPE 2 DIABETES MELLITUS WITH DIABETIC NEUROPATHY, WITH LONG-TERM CURRENT USE OF INSULIN (HCC): ICD-10-CM

## 2022-02-24 DIAGNOSIS — E03.9 ACQUIRED HYPOTHYROIDISM: ICD-10-CM

## 2022-02-24 DIAGNOSIS — E11.40 TYPE 2 DIABETES MELLITUS WITH DIABETIC NEUROPATHY, WITH LONG-TERM CURRENT USE OF INSULIN (HCC): ICD-10-CM

## 2022-02-24 PROCEDURE — 99204 OFFICE O/P NEW MOD 45 MIN: CPT | Performed by: INTERNAL MEDICINE

## 2022-02-24 PROCEDURE — 3051F HG A1C>EQUAL 7.0%<8.0%: CPT | Performed by: INTERNAL MEDICINE

## 2022-02-24 RX ORDER — POLYETHYLENE GLYCOL 3350 17 G/17G
POWDER, FOR SOLUTION ORAL
Qty: 510 G | Refills: 0 | Status: SHIPPED | OUTPATIENT
Start: 2022-02-24

## 2022-02-24 NOTE — PROGRESS NOTES
COLONOSCOPY IS SCHEDULED FOR 3- AT 10:00 AM.  COVID TEST IS SCHEDULED FOR 3-. NO PA REQUIRED PER JOANNA P-[employer]-REF # [id number].  ON 2-

## 2022-02-24 NOTE — PROGRESS NOTES
1. Have you been to the ER, urgent care clinic since your last visit? Hospitalized since your last visit?  6 mos ago l hip pain    2. Have you seen or consulted any other health care providers outside of the [address] [place] [address] since your last visit? Include any pap smears or colon screening.  Yes see record  Chief Complaint   Patient presents with    Colon Polyps     Visit Vitals  BP (!) 140/80 (BP 1 Location: Left upper arm, BP Patient Position: Sitting, BP Cuff Size: Adult)   Pulse 80   Temp 97.1 °F (36.2 °C) (Temporal)   Resp 14   Ht 5' 9\" (1.753 m)   Wt 115.2 kg (254 lb)   SpO2 97% Comment: room air   BMI 37.51 kg/m²

## 2022-02-24 NOTE — H&P (VIEW-ONLY)
Fortunato Disla is a 78 y.o. male who presents today for the following:  Chief Complaint   Patient presents with    Colon Polyps         Allergies   Allergen Reactions    [place] ([facility]) Hives and Itching       Current Outpatient Medications   Medication Sig    polyethylene glycol (MIRALAX) 17 gram/dose powder Use as directed  Indications: emptying of the bowel    pregabalin (LYRICA) 200 mg capsule TAKE 1 CAPSULE 3 TIMES A   DAY. MAXIMUM DAILY DOSE:   600MG    Farxiga 5 mg tab tablet TAKE 1 TABLET DAILY    potassium citrate (UROCIT-K) 15 mEq TbER tablet Take 1 Tablet by mouth daily. Take 1 tab  By mouth daily    tamsulosin (Flomax) 0.4 mg capsule Take 1 Capsule by mouth daily.  BD Ultra-Fine Short Pen Needle 31 gauge x 5/16\" ndle     metoprolol succinate (TOPROL-XL) 25 mg XL tablet     insulin degludec Rissa Faes FlexTouch U-100) 100 unit/mL (3 mL) inpn 30 Units by SubCUTAneous route nightly for 90 days.  rosuvastatin (CRESTOR) 20 mg tablet Take 20 mg by mouth daily.  metFORMIN (GLUCOPHAGE) 500 mg tablet Take 500 mg by mouth two (2) times daily (with meals).  SITagliptin (Januvia) 100 mg tablet Take 100 mg by mouth daily.  escitalopram oxalate (LEXAPRO) 20 mg tablet Take 20 mg by mouth daily.  losartan (COZAAR) 25 mg tablet Take 25 mg by mouth two (2) times a day.  levothyroxine (SYNTHROID) 50 mcg tablet Take 50 mcg by mouth Daily (before breakfast).  colesevelam (WELCHOL) 625 mg tablet Take 625 mg by mouth two (2) times daily (with meals).  cephALEXin (KEFLEX) 500 mg capsule Take 500 mg by mouth daily.  valACYclovir (VALTREX) 500 mg tablet Take 500 mg by mouth two (2) times daily as needed.  coenzyme Q-10 (CO Q-10) 200 mg capsule Take 200 mg by mouth daily.  aspirin delayed-release 81 mg tablet Take 81 mg by mouth daily.  cephALEXin (KEFLEX) 500 mg capsule Take 1 Capsule by mouth once over twenty-four (24) hours.  Take 1 tab po once daily (Patient not taking: Reported on 2/24/2022)    tamsulosin (FLOMAX) 0.4 mg capsule Take 0.4 mg by mouth daily. (Patient not taking: Reported on 2/24/2022)    potassium citrate (UROCIT-K) 15 mEq TbER tablet Take 15 mEq by mouth daily. (Patient not taking: Reported on 2/24/2022)    omega 3-DHA-EPA-fish oil (Fish Oil) 1,000 mg (120 mg-180 mg) capsule Take 1 Capsule by mouth daily. OTC (Patient not taking: Reported on 2/24/2022)     No current facility-administered medications for this visit.        Past Medical History:   Diagnosis Date    Colon polyps     Diabetes ([facility] [address] [facility]     Hypercholesterolemia     Hypertension     Hypokalemia     Hypothyroidism     Kidney stones     Neuropathy     Prostatitis     Psychiatric disorder     depression/anxiety       Past Surgical History:   Procedure Laterality Date    COLONOSCOPY  11/21/2013    12 polyps    COLONOSCOPY  12/15/2016    no ployps    HX CATARACT REMOVAL      HX LITHOTRIPSY         Family History   Problem Relation Age of Onset    Hypertension Mother     Heart Disease Mother     Heart Disease Father     Lung Cancer Father        Social History     Socioeconomic History    Marital status:      Spouse name: Not on file    Number of children: Not on file    Years of education: Not on file    Highest education level: Not on file   Occupational History    Not on file   Tobacco Use    Smoking status: Never Smoker    Smokeless tobacco: Never Used   Vaping Use    Vaping Use: Never used   Substance and Sexual Activity    Alcohol use: Not Currently    Drug use: Not Currently    Sexual activity: Not on file   Other Topics Concern    Not on file   Social History Narrative    Not on file     Social Determinants of Health     Financial Resource Strain:     Difficulty of Paying Living Expenses: Not on file   Food Insecurity:     Worried About Running Out of Food in the Last Year: Not on file    Lisa of Food in the Last Year: Not on file Transportation Needs:     Lack of Transportation (Medical): Not on file    Lack of Transportation (Non-Medical): Not on file   Physical Activity:     Days of Exercise per Week: Not on file    Minutes of Exercise per Session: Not on file   Stress:     Feeling of Stress : Not on file   Social Connections:     Frequency of Communication with Friends and Family: Not on file    Frequency of Social Gatherings with Friends and Family: Not on file    Attends [employer] Services: Not on file    Active Member of [address] [place] [address] [employer] or Organizations: Not on file    Attends Club or Organization Meetings: Not on file    Marital Status: Not on file   Intimate Partner Violence:     Fear of Current or Ex-Partner: Not on file    Emotionally Abused: Not on file    Physically Abused: Not on file    Sexually Abused: Not on file   Housing Stability:     Unable to Pay for Housing in the Last Year: Not on file    Number of [place] in the Last Year: Not on file    Unstable Housing in the Last Year: Not on file         HPI  77-year-old male with history of hypertension, hyperlipidemia, diabetes mellitus type 2, hypothyroidism, anxiety/depression, and colonic polyps who comes in for evaluation. Patient states he is moved to this area from [place]. He had a colonoscopy in 2013 where 12 polyps were removed. He had a repeat study done in 2016, but states no polyps were found. He states he was told he needed a repeat study in 5 years. He states when he eats he occasionally becomes nauseated. He does have a ventral hernia which is asymptomatic. He states his bowel movements are generally regular, but he does take a couple stool softeners as needed. No gross GI bleeding. He states that his weight has recently been stable    Review of Systems   Constitutional: Negative. HENT: Negative. Negative for nosebleeds. Eyes: Negative. Respiratory: Negative. Cardiovascular: Negative.     Gastrointestinal: Positive for nausea. Negative for abdominal pain, blood in stool, constipation, diarrhea, heartburn, melena and vomiting. Genitourinary: Negative. Musculoskeletal: Positive for joint pain. Skin: Negative. Neurological: Negative. Endo/Heme/Allergies: Negative. Psychiatric/Behavioral: Negative. All other systems reviewed and are negative. Visit Vitals  BP (!) 140/80 (BP 1 Location: Left upper arm, BP Patient Position: Sitting, BP Cuff Size: Adult)   Pulse 80   Temp 97.1 °F (36.2 °C) (Temporal)   Resp 14   Ht 5' 9\" (1.753 m)   Wt 115.2 kg (254 lb)   SpO2 97% Comment: room air   BMI 37.51 kg/m²     Physical Exam  Vitals and nursing note reviewed. Constitutional:       Appearance: Normal appearance. He is obese. HENT:      Head: Normocephalic and atraumatic. Nose: Nose normal.      Mouth/Throat:      Mouth: Mucous membranes are moist.      Pharynx: Oropharynx is clear. Eyes:      General: No scleral icterus. Extraocular Movements: Extraocular movements intact. Conjunctiva/sclera: Conjunctivae normal.      Pupils: Pupils are equal, round, and reactive to light. Cardiovascular:      Rate and Rhythm: Normal rate and regular rhythm. Heart sounds: Normal heart sounds. Pulmonary:      Effort: Pulmonary effort is normal.      Breath sounds: Normal breath sounds. Abdominal:      General: Bowel sounds are normal. There is no distension. Palpations: Abdomen is soft. There is no mass. Tenderness: There is no abdominal tenderness. There is no right CVA tenderness, left CVA tenderness, guarding or rebound. Hernia: No hernia is present. Musculoskeletal:         General: Normal range of motion. Cervical back: Normal range of motion and neck supple. Skin:     General: Skin is warm and dry. Coloration: Skin is not jaundiced. Neurological:      General: No focal deficit present. Mental Status: He is alert and oriented to person, place, and time. Psychiatric:         Mood and Affect: Mood normal.         Behavior: Behavior normal.         Thought Content: Thought content normal.         Judgment: Judgment normal.            1. History of colon polyps  We will schedule for a surveillance colonoscopy. - COLONOSCOPY,DIAGNOSTIC; Future  - polyethylene glycol (MIRALAX) 17 gram/dose powder; Use as directed  Indications: emptying of the bowel  Dispense: 510 g; Refill: 0    2. Type 2 diabetes mellitus with diabetic neuropathy, with long-term current use of insulin ([facility] [address]      3.  Acquired hypothyroidism

## 2022-02-24 NOTE — PROGRESS NOTES
Darby Houston is a 78 y.o. male who presents today for the following:  Chief Complaint   Patient presents with    Colon Polyps         Allergies   Allergen Reactions    [place] ([facility]) Hives and Itching       Current Outpatient Medications   Medication Sig    polyethylene glycol (MIRALAX) 17 gram/dose powder Use as directed  Indications: emptying of the bowel    pregabalin (LYRICA) 200 mg capsule TAKE 1 CAPSULE 3 TIMES A   DAY. MAXIMUM DAILY DOSE:   600MG    Farxiga 5 mg tab tablet TAKE 1 TABLET DAILY    potassium citrate (UROCIT-K) 15 mEq TbER tablet Take 1 Tablet by mouth daily. Take 1 tab  By mouth daily    tamsulosin (Flomax) 0.4 mg capsule Take 1 Capsule by mouth daily.  BD Ultra-Fine Short Pen Needle 31 gauge x 5/16\" ndle     metoprolol succinate (TOPROL-XL) 25 mg XL tablet     insulin degludec Brigid Littler FlexTouch U-100) 100 unit/mL (3 mL) inpn 30 Units by SubCUTAneous route nightly for 90 days.  rosuvastatin (CRESTOR) 20 mg tablet Take 20 mg by mouth daily.  metFORMIN (GLUCOPHAGE) 500 mg tablet Take 500 mg by mouth two (2) times daily (with meals).  SITagliptin (Januvia) 100 mg tablet Take 100 mg by mouth daily.  escitalopram oxalate (LEXAPRO) 20 mg tablet Take 20 mg by mouth daily.  losartan (COZAAR) 25 mg tablet Take 25 mg by mouth two (2) times a day.  levothyroxine (SYNTHROID) 50 mcg tablet Take 50 mcg by mouth Daily (before breakfast).  colesevelam (WELCHOL) 625 mg tablet Take 625 mg by mouth two (2) times daily (with meals).  cephALEXin (KEFLEX) 500 mg capsule Take 500 mg by mouth daily.  valACYclovir (VALTREX) 500 mg tablet Take 500 mg by mouth two (2) times daily as needed.  coenzyme Q-10 (CO Q-10) 200 mg capsule Take 200 mg by mouth daily.  aspirin delayed-release 81 mg tablet Take 81 mg by mouth daily.  cephALEXin (KEFLEX) 500 mg capsule Take 1 Capsule by mouth once over twenty-four (24) hours.  Take 1 tab po once daily (Patient not taking: Reported on 2/24/2022)    tamsulosin (FLOMAX) 0.4 mg capsule Take 0.4 mg by mouth daily. (Patient not taking: Reported on 2/24/2022)    potassium citrate (UROCIT-K) 15 mEq TbER tablet Take 15 mEq by mouth daily. (Patient not taking: Reported on 2/24/2022)    omega 3-DHA-EPA-fish oil (Fish Oil) 1,000 mg (120 mg-180 mg) capsule Take 1 Capsule by mouth daily. OTC (Patient not taking: Reported on 2/24/2022)     No current facility-administered medications for this visit.        Past Medical History:   Diagnosis Date    Colon polyps     Diabetes ([address]     Hypercholesterolemia     Hypertension     Hypokalemia     Hypothyroidism     Kidney stones     Neuropathy     Prostatitis     Psychiatric disorder     depression/anxiety       Past Surgical History:   Procedure Laterality Date    COLONOSCOPY  11/21/2013    12 polyps    COLONOSCOPY  12/15/2016    no ployps    HX CATARACT REMOVAL      HX LITHOTRIPSY         Family History   Problem Relation Age of Onset    Hypertension Mother     Heart Disease Mother     Heart Disease Father     Lung Cancer Father        Social History     Socioeconomic History    Marital status:      Spouse name: Not on file    Number of children: Not on file    Years of education: Not on file    Highest education level: Not on file   Occupational History    Not on file   Tobacco Use    Smoking status: Never Smoker    Smokeless tobacco: Never Used   Vaping Use    Vaping Use: Never used   Substance and Sexual Activity    Alcohol use: Not Currently    Drug use: Not Currently    Sexual activity: Not on file   Other Topics Concern    Not on file   Social History Narrative    Not on file     Social Determinants of Health     Financial Resource Strain:     Difficulty of Paying Living Expenses: Not on file   Food Insecurity:     Worried About Running Out of Food in the Last Year: Not on file    Lisa of Food in the Last Year: Not on file Transportation Needs:     Lack of Transportation (Medical): Not on file    Lack of Transportation (Non-Medical): Not on file   Physical Activity:     Days of Exercise per Week: Not on file    Minutes of Exercise per Session: Not on file   Stress:     Feeling of Stress : Not on file   Social Connections:     Frequency of Communication with Friends and Family: Not on file    Frequency of Social Gatherings with Friends and Family: Not on file    Attends [employer] Services: Not on file    Active Member of [address] [place] [address] [employer] or Organizations: Not on file    Attends Club or Organization Meetings: Not on file    Marital Status: Not on file   Intimate Partner Violence:     Fear of Current or Ex-Partner: Not on file    Emotionally Abused: Not on file    Physically Abused: Not on file    Sexually Abused: Not on file   Housing Stability:     Unable to Pay for Housing in the Last Year: Not on file    Number of [place] in the Last Year: Not on file    Unstable Housing in the Last Year: Not on file         HPI  74-year-old male with history of hypertension, hyperlipidemia, diabetes mellitus type 2, hypothyroidism, anxiety/depression, and colonic polyps who comes in for evaluation. Patient states he is moved to this area from [place]. He had a colonoscopy in 2013 where 12 polyps were removed. He had a repeat study done in 2016, but states no polyps were found. He states he was told he needed a repeat study in 5 years. He states when he eats he occasionally becomes nauseated. He does have a ventral hernia which is asymptomatic. He states his bowel movements are generally regular, but he does take a couple stool softeners as needed. No gross GI bleeding. He states that his weight has recently been stable    Review of Systems   Constitutional: Negative. HENT: Negative. Negative for nosebleeds. Eyes: Negative. Respiratory: Negative. Cardiovascular: Negative.     Gastrointestinal: Positive for nausea. Negative for abdominal pain, blood in stool, constipation, diarrhea, heartburn, melena and vomiting. Genitourinary: Negative. Musculoskeletal: Positive for joint pain. Skin: Negative. Neurological: Negative. Endo/Heme/Allergies: Negative. Psychiatric/Behavioral: Negative. All other systems reviewed and are negative. Visit Vitals  BP (!) 140/80 (BP 1 Location: Left upper arm, BP Patient Position: Sitting, BP Cuff Size: Adult)   Pulse 80   Temp 97.1 °F (36.2 °C) (Temporal)   Resp 14   Ht 5' 9\" (1.753 m)   Wt 115.2 kg (254 lb)   SpO2 97% Comment: room air   BMI 37.51 kg/m²     Physical Exam  Vitals and nursing note reviewed. Constitutional:       Appearance: Normal appearance. He is obese. HENT:      Head: Normocephalic and atraumatic. Nose: Nose normal.      Mouth/Throat:      Mouth: Mucous membranes are moist.      Pharynx: Oropharynx is clear. Eyes:      General: No scleral icterus. Extraocular Movements: Extraocular movements intact. Conjunctiva/sclera: Conjunctivae normal.      Pupils: Pupils are equal, round, and reactive to light. Cardiovascular:      Rate and Rhythm: Normal rate and regular rhythm. Heart sounds: Normal heart sounds. Pulmonary:      Effort: Pulmonary effort is normal.      Breath sounds: Normal breath sounds. Abdominal:      General: Bowel sounds are normal. There is no distension. Palpations: Abdomen is soft. There is no mass. Tenderness: There is no abdominal tenderness. There is no right CVA tenderness, left CVA tenderness, guarding or rebound. Hernia: No hernia is present. Musculoskeletal:         General: Normal range of motion. Cervical back: Normal range of motion and neck supple. Skin:     General: Skin is warm and dry. Coloration: Skin is not jaundiced. Neurological:      General: No focal deficit present. Mental Status: He is alert and oriented to person, place, and time. Psychiatric:         Mood and Affect: Mood normal.         Behavior: Behavior normal.         Thought Content: Thought content normal.         Judgment: Judgment normal.            1. History of colon polyps  We will schedule for a surveillance colonoscopy. - COLONOSCOPY,DIAGNOSTIC; Future  - polyethylene glycol (MIRALAX) 17 gram/dose powder; Use as directed  Indications: emptying of the bowel  Dispense: 510 g; Refill: 0    2. Type 2 diabetes mellitus with diabetic neuropathy, with long-term current use of insulin ([facility] [address]      3.  Acquired hypothyroidism

## 2022-03-15 ENCOUNTER — HOSPITAL ENCOUNTER (OUTPATIENT)
Dept: PREADMISSION TESTING | Age: 77
Discharge: HOME | End: 2022-03-15
Payer: COMMERCIAL

## 2022-03-15 LAB
FLUAV RNA SPEC QL NAA+PROBE: NOT DETECTED
FLUBV RNA SPEC QL NAA+PROBE: NOT DETECTED
SARS-COV-2, COV2: NOT DETECTED

## 2022-03-15 PROCEDURE — 87636 SARSCOV2 & INF A&B AMP PRB: CPT

## 2022-03-17 ENCOUNTER — ANESTHESIA EVENT (OUTPATIENT)
Dept: ENDOSCOPY | Age: 77
End: 2022-03-17
Payer: COMMERCIAL

## 2022-03-18 ENCOUNTER — HOSPITAL ENCOUNTER (OUTPATIENT)
Age: 77
Setting detail: OUTPATIENT SURGERY
Discharge: HOME | End: 2022-03-18
Attending: INTERNAL MEDICINE | Admitting: INTERNAL MEDICINE
Payer: COMMERCIAL

## 2022-03-18 ENCOUNTER — ANESTHESIA (OUTPATIENT)
Dept: ENDOSCOPY | Age: 77
End: 2022-03-18
Payer: COMMERCIAL

## 2022-03-18 VITALS
BODY MASS INDEX: 38.95 KG/M2 | OXYGEN SATURATION: 97 % | RESPIRATION RATE: 18 BRPM | WEIGHT: 257 LBS | HEIGHT: 68 IN | HEART RATE: 67 BPM | SYSTOLIC BLOOD PRESSURE: 121 MMHG | TEMPERATURE: 98.1 F | DIASTOLIC BLOOD PRESSURE: 70 MMHG

## 2022-03-18 PROBLEM — E66.01 SEVERE OBESITY (BMI 35.0-35.9 WITH COMORBIDITY) (HCC): Status: ACTIVE | Noted: 2022-01-13

## 2022-03-18 PROBLEM — N20.0 KIDNEY STONES: Status: ACTIVE | Noted: 2020-08-24

## 2022-03-18 PROBLEM — G62.9 NEUROPATHY: Status: ACTIVE | Noted: 2022-01-13

## 2022-03-18 LAB
GLUCOSE BLD STRIP.AUTO-MCNC: 136 MG/DL (ref 65–117)
PERFORMED BY, TECHID: ABNORMAL

## 2022-03-18 PROCEDURE — 45378 DIAGNOSTIC COLONOSCOPY: CPT | Performed by: INTERNAL MEDICINE

## 2022-03-18 PROCEDURE — 82962 GLUCOSE BLOOD TEST: CPT

## 2022-03-18 PROCEDURE — 74011250636 HC RX REV CODE- 250/636: Performed by: INTERNAL MEDICINE

## 2022-03-18 PROCEDURE — 76040000019: Performed by: INTERNAL MEDICINE

## 2022-03-18 PROCEDURE — 74011250636 HC RX REV CODE- 250/636: Performed by: NURSE ANESTHETIST, CERTIFIED REGISTERED

## 2022-03-18 PROCEDURE — 74011250636 HC RX REV CODE- 250/636

## 2022-03-18 PROCEDURE — 76060000031 HC ANESTHESIA FIRST 0.5 HR: Performed by: INTERNAL MEDICINE

## 2022-03-18 PROCEDURE — 74011000250 HC RX REV CODE- 250: Performed by: NURSE ANESTHETIST, CERTIFIED REGISTERED

## 2022-03-18 PROCEDURE — 2709999900 HC NON-CHARGEABLE SUPPLY: Performed by: INTERNAL MEDICINE

## 2022-03-18 RX ORDER — SODIUM CHLORIDE 9 MG/ML
125 INJECTION, SOLUTION INTRAVENOUS CONTINUOUS
Status: DISCONTINUED | OUTPATIENT
Start: 2022-03-18 | End: 2022-03-18 | Stop reason: HOSPADM

## 2022-03-18 RX ORDER — SODIUM CHLORIDE 0.9 % (FLUSH) 0.9 %
5-40 SYRINGE (ML) INJECTION EVERY 8 HOURS
Status: DISCONTINUED | OUTPATIENT
Start: 2022-03-18 | End: 2022-03-18 | Stop reason: HOSPADM

## 2022-03-18 RX ORDER — SODIUM CHLORIDE 0.9 % (FLUSH) 0.9 %
5-40 SYRINGE (ML) INJECTION AS NEEDED
Status: DISCONTINUED | OUTPATIENT
Start: 2022-03-18 | End: 2022-03-18 | Stop reason: HOSPADM

## 2022-03-18 RX ORDER — PROPOFOL 10 MG/ML
INJECTION, EMULSION INTRAVENOUS AS NEEDED
Status: DISCONTINUED | OUTPATIENT
Start: 2022-03-18 | End: 2022-03-18 | Stop reason: HOSPADM

## 2022-03-18 RX ORDER — LIDOCAINE HYDROCHLORIDE 20 MG/ML
INJECTION, SOLUTION EPIDURAL; INFILTRATION; INTRACAUDAL; PERINEURAL AS NEEDED
Status: DISCONTINUED | OUTPATIENT
Start: 2022-03-18 | End: 2022-03-18 | Stop reason: HOSPADM

## 2022-03-18 RX ORDER — SODIUM CHLORIDE 9 MG/ML
INJECTION, SOLUTION INTRAVENOUS
Status: DISCONTINUED | OUTPATIENT
Start: 2022-03-18 | End: 2022-03-18 | Stop reason: HOSPADM

## 2022-03-18 RX ADMIN — LIDOCAINE HYDROCHLORIDE 60 MG: 20 INJECTION, SOLUTION EPIDURAL; INFILTRATION; INTRACAUDAL at 11:28

## 2022-03-18 RX ADMIN — PROPOFOL 50 MG: 10 INJECTION, EMULSION INTRAVENOUS at 11:35

## 2022-03-18 RX ADMIN — PROPOFOL 50 MG: 10 INJECTION, EMULSION INTRAVENOUS at 11:28

## 2022-03-18 RX ADMIN — SODIUM CHLORIDE: 9 INJECTION, SOLUTION INTRAVENOUS at 11:18

## 2022-03-18 RX ADMIN — SODIUM CHLORIDE 125 ML/HR: 9 INJECTION, SOLUTION INTRAVENOUS at 10:04

## 2022-03-18 RX ADMIN — PROPOFOL 50 MG: 10 INJECTION, EMULSION INTRAVENOUS at 11:30

## 2022-03-18 RX ADMIN — PROPOFOL 50 MG: 10 INJECTION, EMULSION INTRAVENOUS at 11:32

## 2022-03-18 NOTE — ANESTHESIA POSTPROCEDURE EVALUATION
Procedure(s):  COLONOSCOPY (ANES TIVA).     MAC    Anesthesia Post Evaluation        Patient participation: complete - patient participated  Level of consciousness: awake and alert  Pain score: 0  Pain management: adequate  Airway patency: patent  Anesthetic complications: no  Cardiovascular status: acceptable  Respiratory status: acceptable  Hydration status: acceptable  Post anesthesia nausea and vomiting:  none      INITIAL Post-op Vital signs:   Vitals Value Taken Time   /70 03/18/22 1215   Temp 36.7 °C (98.1 °F) 03/18/22 1156   Pulse 67 03/18/22 1215   Resp 18 03/18/22 1215   SpO2 97 % 03/18/22 1215

## 2022-03-18 NOTE — INTERVAL H&P NOTE
Update History & Physical    The Patient's History and Physical of March 18, 2022,  was reviewed with the patient and I examined the patient. There was no change. The surgical site was confirmed by the patient and me. Plan:  The risk, benefits, expected outcome, and alternative to the recommended procedure have been discussed with the patient. Patient understands and wants to proceed with the procedure.     Electronically signed by Patricia Mccoy MD on 3/18/2022 at 9:22 AM

## 2022-03-18 NOTE — ANESTHESIA PREPROCEDURE EVALUATION
Relevant Problems   No relevant active problems       Anesthetic History   No history of anesthetic complications            Review of Systems / Medical History  Patient summary reviewed, nursing notes reviewed and pertinent labs reviewed    Pulmonary  Within defined limits                 Neuro/Psych         Psychiatric history     Cardiovascular    Hypertension                   GI/Hepatic/Renal  Within defined limits              Endo/Other    Diabetes  Hypothyroidism  Morbid obesity     Other Findings              Physical Exam    Airway  Mallampati: III  TM Distance: 4 - 6 cm  Neck ROM: normal range of motion   Mouth opening: Normal     Cardiovascular  Regular rate and rhythm,  S1 and S2 normal,  no murmur, click, rub, or gallop             Dental  No notable dental hx       Pulmonary  Breath sounds clear to auscultation               Abdominal  GI exam deferred       Other Findings            Anesthetic Plan    ASA: 3  Anesthesia type: MAC    Monitoring Plan: Continuous noninvasive hemodynamic monitoring      Induction: Intravenous  Anesthetic plan and risks discussed with: Patient

## 2022-03-18 NOTE — OP NOTES
Colonoscopy Procedure Note      Patient: Amberly Flanagan MRN: [id number]  SSN: [id number]    YOB: 1945  Age: 78 y.o. Sex: male      Date of Procedure: 3/18/2022  Date/Time:  3/18/2022 11:47 AM       IMPRESSION:     1. Internal hemorrhoids (grade 1)         RECOMMENDATIONS:     1) Repeat colonoscopy in 5 years. INDICATION: History of colon polyps     PROCEDURE PERFORMED: Colonoscopy     DESCRIPTION OF PROCEDURE: An informed consent was obtained. The patient was placed in left lateral position. Perianal inspection and a digital rectal exam was performed. Video colonoscope was introduced into the rectum and advanced under direct vision up to the terminal ileum. With adequate insufflation and maneuvering of the withdrawing scope, the colonic mucosa was visualized carefully. Retroflexion was performed in the rectum to see the anorectum and also in the ascending colon to look behind the folds. Vital signs, pulse oximetry, single lead cardiac monitor were monitored throughout the procedure as the sedation was titrated to the desired effect ensuring patient comfort and safety. The patient tolerated the procedure very well and was transferred to the recovery area. Following is the summary of findings: No lesions were noted to the level of the cecum. As we passed the scope through the anal canal we saw small internal hemorrhoids were mildly inflamed. ENDOSCOPIST: Genevieve Graf MD      ENDOSCOPE: Olympus video colonoscope     ASSISTANT:Circ-1: Jan Sawyer              Scrub Tech-1: Saul FOX     ANESTHESIA: TIVA      QUALITY OF PREPARATION: Good      FINDINGS:   1.  Internal hemorrhoids (grade 1)       Complications: None     EBL: None     SPECIMENS: * No specimens in log Bolivar Sims MD  March 18, 2022  11:47 AM

## 2022-03-18 NOTE — DISCHARGE INSTRUCTIONS

## 2022-03-19 PROBLEM — E11.40 TYPE 2 DIABETES MELLITUS WITH DIABETIC NEUROPATHY (HCC): Status: ACTIVE | Noted: 2022-01-06

## 2022-03-19 PROBLEM — Z76.0 PRESCRIPTION REFILL: Status: ACTIVE | Noted: 2022-02-23

## 2022-03-19 PROBLEM — E11.9 DM (DIABETES MELLITUS) (HCC): Status: ACTIVE | Noted: 2018-12-26

## 2022-03-19 PROBLEM — N40.1 BPH WITH OBSTRUCTION/LOWER URINARY TRACT SYMPTOMS: Status: ACTIVE | Noted: 2022-01-13

## 2022-03-19 PROBLEM — Z86.010 HISTORY OF COLON POLYPS: Status: ACTIVE | Noted: 2022-01-13

## 2022-03-19 PROBLEM — N13.8 BPH WITH OBSTRUCTION/LOWER URINARY TRACT SYMPTOMS: Status: ACTIVE | Noted: 2022-01-13

## 2022-03-19 PROBLEM — E03.9 HYPOTHYROIDISM: Status: ACTIVE | Noted: 2020-08-24

## 2022-03-19 PROBLEM — Z86.0100 HISTORY OF COLON POLYPS: Status: ACTIVE | Noted: 2022-01-13

## 2022-03-19 PROBLEM — R82.998 URICOSURIA: Status: ACTIVE | Noted: 2022-02-23

## 2022-03-19 PROBLEM — N39.0 URINARY TRACT INFECTION WITHOUT HEMATURIA: Status: ACTIVE | Noted: 2022-02-23

## 2022-03-19 PROBLEM — E78.2 MIXED HYPERLIPIDEMIA: Status: ACTIVE | Noted: 2020-08-24

## 2022-03-19 PROBLEM — I10 ESSENTIAL HYPERTENSION: Status: ACTIVE | Noted: 2018-12-26

## 2022-03-19 PROBLEM — R39.198 SLOW URINARY STREAM: Status: ACTIVE | Noted: 2022-02-23

## 2022-03-20 PROBLEM — B00.9 HERPES: Status: ACTIVE | Noted: 2022-01-13

## 2022-03-26 DIAGNOSIS — E11.40 TYPE 2 DIABETES MELLITUS WITH DIABETIC NEUROPATHY, WITH LONG-TERM CURRENT USE OF INSULIN (HCC): ICD-10-CM

## 2022-03-26 DIAGNOSIS — Z79.4 TYPE 2 DIABETES MELLITUS WITH DIABETIC NEUROPATHY, WITH LONG-TERM CURRENT USE OF INSULIN (HCC): ICD-10-CM

## 2022-03-26 PROCEDURE — 3051F HG A1C>EQUAL 7.0%<8.0%: CPT | Performed by: NURSE PRACTITIONER

## 2022-03-27 RX ORDER — INSULIN DEGLUDEC INJECTION 100 U/ML
INJECTION, SOLUTION SUBCUTANEOUS
Qty: 30 ML | Refills: 0 | Status: SHIPPED | OUTPATIENT
Start: 2022-03-27 | End: 2022-06-03 | Stop reason: SDUPTHER

## 2022-05-12 DIAGNOSIS — E11.69 HYPERLIPIDEMIA DUE TO TYPE 2 DIABETES MELLITUS (HCC): ICD-10-CM

## 2022-05-12 DIAGNOSIS — E78.5 HYPERLIPIDEMIA DUE TO TYPE 2 DIABETES MELLITUS (HCC): ICD-10-CM

## 2022-05-12 DIAGNOSIS — F32.A ANXIETY AND DEPRESSION: Primary | ICD-10-CM

## 2022-05-12 DIAGNOSIS — E03.9 ACQUIRED HYPOTHYROIDISM: ICD-10-CM

## 2022-05-12 DIAGNOSIS — F41.9 ANXIETY AND DEPRESSION: Primary | ICD-10-CM

## 2022-05-12 RX ORDER — ESCITALOPRAM OXALATE 20 MG/1
20 TABLET ORAL DAILY
Qty: 90 TABLET | Refills: 1 | Status: SHIPPED | OUTPATIENT
Start: 2022-05-12

## 2022-05-12 RX ORDER — LEVOTHYROXINE SODIUM 50 UG/1
50 TABLET ORAL
Qty: 90 TABLET | Refills: 1 | Status: SHIPPED | OUTPATIENT
Start: 2022-05-12

## 2022-05-12 RX ORDER — ROSUVASTATIN CALCIUM 20 MG/1
20 TABLET, COATED ORAL DAILY
Qty: 90 TABLET | Refills: 1 | Status: SHIPPED | OUTPATIENT
Start: 2022-05-12

## 2022-05-16 DIAGNOSIS — Z79.4 TYPE 2 DIABETES MELLITUS WITH DIABETIC NEUROPATHY, WITH LONG-TERM CURRENT USE OF INSULIN (HCC): Primary | ICD-10-CM

## 2022-05-16 DIAGNOSIS — E11.40 TYPE 2 DIABETES MELLITUS WITH DIABETIC NEUROPATHY, WITH LONG-TERM CURRENT USE OF INSULIN (HCC): Primary | ICD-10-CM

## 2022-05-16 RX ORDER — PEN NEEDLE, DIABETIC 31 GX5/16"
NEEDLE, DISPOSABLE MISCELLANEOUS
Qty: 100 EACH | Refills: 5 | Status: SHIPPED | OUTPATIENT
Start: 2022-05-16

## 2022-05-16 RX ORDER — METFORMIN HYDROCHLORIDE 500 MG/1
500 TABLET ORAL 2 TIMES DAILY WITH MEALS
Qty: 180 TABLET | Refills: 1 | Status: SHIPPED | OUTPATIENT
Start: 2022-05-16 | End: 2022-08-15

## 2022-05-19 DIAGNOSIS — Z79.4 TYPE 2 DIABETES MELLITUS WITH DIABETIC NEUROPATHY, WITH LONG-TERM CURRENT USE OF INSULIN (HCC): ICD-10-CM

## 2022-05-19 DIAGNOSIS — E11.40 TYPE 2 DIABETES MELLITUS WITH DIABETIC NEUROPATHY, WITH LONG-TERM CURRENT USE OF INSULIN (HCC): ICD-10-CM

## 2022-05-19 RX ORDER — DAPAGLIFLOZIN 5 MG/1
TABLET, FILM COATED ORAL
Qty: 90 TABLET | Refills: 0 | Status: SHIPPED | OUTPATIENT
Start: 2022-05-19 | End: 2022-07-21

## 2022-05-27 DIAGNOSIS — E11.40 TYPE 2 DIABETES MELLITUS WITH DIABETIC NEUROPATHY, WITH LONG-TERM CURRENT USE OF INSULIN (HCC): ICD-10-CM

## 2022-05-27 DIAGNOSIS — B00.9 HERPES: Primary | ICD-10-CM

## 2022-05-27 DIAGNOSIS — Z79.4 TYPE 2 DIABETES MELLITUS WITH DIABETIC NEUROPATHY, WITH LONG-TERM CURRENT USE OF INSULIN (HCC): ICD-10-CM

## 2022-05-27 RX ORDER — VALACYCLOVIR HYDROCHLORIDE 500 MG/1
500 TABLET, FILM COATED ORAL
Qty: 180 TABLET | Refills: 0 | Status: SHIPPED | OUTPATIENT
Start: 2022-05-27 | End: 2022-08-25

## 2022-06-03 ENCOUNTER — OFFICE VISIT (OUTPATIENT)
Dept: PRIMARY CARE CLINIC | Age: 77
End: 2022-06-03
Payer: COMMERCIAL

## 2022-06-03 ENCOUNTER — TELEPHONE (OUTPATIENT)
Dept: ENT CLINIC | Age: 77
End: 2022-06-03

## 2022-06-03 VITALS
RESPIRATION RATE: 17 BRPM | OXYGEN SATURATION: 98 % | HEART RATE: 68 BPM | SYSTOLIC BLOOD PRESSURE: 117 MMHG | BODY MASS INDEX: 38.74 KG/M2 | TEMPERATURE: 97.4 F | HEIGHT: 67 IN | WEIGHT: 246.8 LBS | DIASTOLIC BLOOD PRESSURE: 63 MMHG

## 2022-06-03 DIAGNOSIS — I51.89 DIASTOLIC DYSFUNCTION: ICD-10-CM

## 2022-06-03 DIAGNOSIS — Z23 ENCOUNTER FOR IMMUNIZATION: ICD-10-CM

## 2022-06-03 DIAGNOSIS — H69.83 DYSFUNCTION OF BOTH EUSTACHIAN TUBES: ICD-10-CM

## 2022-06-03 DIAGNOSIS — E66.01 SEVERE OBESITY (BMI 35.0-35.9 WITH COMORBIDITY) (HCC): ICD-10-CM

## 2022-06-03 DIAGNOSIS — Z79.4 TYPE 2 DIABETES MELLITUS WITH DIABETIC NEUROPATHY, WITH LONG-TERM CURRENT USE OF INSULIN (HCC): Primary | ICD-10-CM

## 2022-06-03 DIAGNOSIS — F32.A ANXIETY AND DEPRESSION: ICD-10-CM

## 2022-06-03 DIAGNOSIS — N40.0 BENIGN PROSTATIC HYPERPLASIA WITHOUT LOWER URINARY TRACT SYMPTOMS: ICD-10-CM

## 2022-06-03 DIAGNOSIS — Z86.010 HISTORY OF COLON POLYPS: ICD-10-CM

## 2022-06-03 DIAGNOSIS — E11.40 TYPE 2 DIABETES MELLITUS WITH DIABETIC NEUROPATHY, WITH LONG-TERM CURRENT USE OF INSULIN (HCC): Primary | ICD-10-CM

## 2022-06-03 DIAGNOSIS — E78.5 HYPERLIPIDEMIA DUE TO TYPE 2 DIABETES MELLITUS (HCC): ICD-10-CM

## 2022-06-03 DIAGNOSIS — H90.3 SENSORINEURAL HEARING LOSS (SNHL) OF BOTH EARS: ICD-10-CM

## 2022-06-03 DIAGNOSIS — G62.9 NEUROPATHY: ICD-10-CM

## 2022-06-03 DIAGNOSIS — B00.9 HERPES: ICD-10-CM

## 2022-06-03 DIAGNOSIS — E11.69 HYPERLIPIDEMIA DUE TO TYPE 2 DIABETES MELLITUS (HCC): ICD-10-CM

## 2022-06-03 DIAGNOSIS — E03.9 ACQUIRED HYPOTHYROIDISM: ICD-10-CM

## 2022-06-03 DIAGNOSIS — F41.9 ANXIETY AND DEPRESSION: ICD-10-CM

## 2022-06-03 LAB — HBA1C MFR BLD HPLC: 6.9 %

## 2022-06-03 PROCEDURE — 83036 HEMOGLOBIN GLYCOSYLATED A1C: CPT | Performed by: NURSE PRACTITIONER

## 2022-06-03 PROCEDURE — 3044F HG A1C LEVEL LT 7.0%: CPT | Performed by: NURSE PRACTITIONER

## 2022-06-03 PROCEDURE — 99214 OFFICE O/P EST MOD 30 MIN: CPT | Performed by: NURSE PRACTITIONER

## 2022-06-03 PROCEDURE — 90670 PCV13 VACCINE IM: CPT | Performed by: NURSE PRACTITIONER

## 2022-06-03 PROCEDURE — 1123F ACP DISCUSS/DSCN MKR DOCD: CPT | Performed by: NURSE PRACTITIONER

## 2022-06-03 RX ORDER — FLUTICASONE PROPIONATE 50 MCG
2 SPRAY, SUSPENSION (ML) NASAL DAILY
Qty: 1 EACH | Refills: 5 | Status: SHIPPED | OUTPATIENT
Start: 2022-06-03

## 2022-06-03 RX ORDER — INSULIN DEGLUDEC INJECTION 100 U/ML
INJECTION, SOLUTION SUBCUTANEOUS
Qty: 30 ML | Refills: 5 | Status: SHIPPED | OUTPATIENT
Start: 2022-06-03

## 2022-06-03 NOTE — PROGRESS NOTES
Chief Complaint   Patient presents with    Diabetes    Hypertension     Follow up     1. \"Have you been to the ER, urgent care clinic since your last visit? Hospitalized since your last visit? \" No    2. \"Have you seen or consulted any other health care providers outside of the [address] [place] [address] since your last visit? \" lily Rhodell cardio. 3. For patients aged 39-70: Has the patient had a colonoscopy / FIT/ Cologuard? Yes - no Care Gap present      If the patient is female:    4. For patients aged 41-77: Has the patient had a mammogram within the past 2 years? NA - based on age or sex      11. For patients aged 21-65: Has the patient had a pap smear?  NA - based on age or sex

## 2022-06-03 NOTE — PROGRESS NOTES
Destiny Ramos is a 78 y.o. male who presents to the office today for the following:    Chief Complaint   Patient presents with    Diabetes    Hypertension       Past Medical History:   Diagnosis Date    Colon polyps     Diabetes ([address]     Hypercholesterolemia     Hypertension     Hypokalemia     Hypothyroidism     Kidney stones     Neuropathy     Prostatitis     Psychiatric disorder     depression/anxiety       Past Surgical History:   Procedure Laterality Date    COLONOSCOPY  11/21/2013    12 polyps    COLONOSCOPY  12/15/2016    no ployps    COLONOSCOPY N/A 3/18/2022    COLONOSCOPY (ANES TIVA) performed by Neftali Sotelo MD at [facility] ENDOSCOPY    HX CATARACT REMOVAL       bilateral cataract surgery     HX LITHOTRIPSY      kidney stone removal        Family History   Problem Relation Age of Onset    Hypertension Mother     Heart Disease Mother     Heart Disease Father     Lung Cancer Father     No Known Problems Maternal Aunt     No Known Problems Maternal Uncle     No Known Problems Paternal Aunt     No Known Problems Paternal Uncle     No Known Problems Maternal Grandmother     No Known Problems Maternal Grandfather     No Known Problems Paternal Grandmother     No Known Problems Paternal Grandfather     No Known Problems Other         Social History     Tobacco Use    Smoking status: Never Smoker    Smokeless tobacco: Never Used   Vaping Use    Vaping Use: Never used   Substance Use Topics    Alcohol use: Not Currently     Alcohol/week: 1.0 standard drink     Types: 1 Cans of beer per week     Comment: ocassional    Drug use: Not Currently      HPI  Patient here today for follow up of chronic conditions with PM of type 2 diabetes, hyperlipidemia, diastolic dysfunction, BPH, neuropathy, hypothyroidism, herpes,anxiety/depression,colon polyps and obesity. Reports taking medications as noted in chart. Has seen cardiology and urology since last visit.  Only concern today is that he has had some intermittent ear pains. Does have hearing loss but not recently wearing hearing aid as he lost right side. No sinus congestion or sore throat. Current Outpatient Medications on File Prior to Visit   Medication Sig    Farxiga 5 mg tab tablet TAKE 1 TABLET DAILY    escitalopram oxalate (LEXAPRO) 20 mg tablet Take 1 Tablet by mouth daily.  levothyroxine (SYNTHROID) 50 mcg tablet Take 1 Tablet by mouth Daily (before breakfast).  rosuvastatin (CRESTOR) 20 mg tablet Take 1 Tablet by mouth daily.  pregabalin (LYRICA) 200 mg capsule TAKE 1 CAPSULE 3 TIMES A   DAY. MAXIMUM DAILY DOSE:   600MG    colesevelam (WELCHOL) 625 mg tablet Take 625 mg by mouth two (2) times daily (with meals).  SITagliptin (Januvia) 100 mg tablet Take 1 Tablet by mouth daily.  valACYclovir (VALTREX) 500 mg tablet Take 1 Tablet by mouth two (2) times daily as needed for PRN Reason (Other) (herpes).  BD Ultra-Fine Short Pen Needle 31 gauge x 5/16\" ndle Use to check glucose 2 times a day    metFORMIN (GLUCOPHAGE) 500 mg tablet Take 1 Tablet by mouth two (2) times daily (with meals).  [DISCONTINUED] Francia Guo FlexTouch U-100 100 unit/mL (3 mL) inpn INJECT 30 UNITS            SUBCUTANEOUSLY NIGHTLY    polyethylene glycol (MIRALAX) 17 gram/dose powder Use as directed  Indications: emptying of the bowel    potassium citrate (UROCIT-K) 15 mEq TbER tablet Take 1 Tablet by mouth daily. Take 1 tab  By mouth daily (Patient taking differently: Take 15 mEq by mouth daily. DR HUMPHREY [facility])   Collins tamsulosin (Flomax) 0.4 mg capsule Take 1 Capsule by mouth daily. (Patient taking differently: Take 0.4 mg by mouth daily. Dr Elvie Buck)   Collins metoprolol succinate (TOPROL-XL) 25 mg XL tablet     losartan (COZAAR) 25 mg tablet Take 25 mg by mouth two (2) times a day.  cephALEXin (KEFLEX) 500 mg capsule Take 500 mg by mouth daily. Dr Flaco Carvajal coenzyme Q-10 (CO Q-10) 200 mg capsule Take 200 mg by mouth daily.     aspirin delayed-release 81 mg tablet Take 81 mg by mouth daily.  omega 3-DHA-EPA-fish oil (Fish Oil) 1,000 mg (120 mg-180 mg) capsule Take 1 Capsule by mouth daily. OTC     No current facility-administered medications on file prior to visit. Medications Ordered Today   Medications    insulin degludec Fortunato Christopher FlexTouch U-100) 100 unit/mL (3 mL) inpn     Sig: INJECT 30 UNITS            SUBCUTANEOUSLY NIGHTLY     Dispense:  30 mL     Refill:  5    fluticasone propionate (FLONASE) 50 mcg/actuation nasal spray     Si Sprays by Both Nostrils route daily. Dispense:  1 Each     Refill:  5        Review of Systems   Constitutional: Negative. HENT: Negative. Eyes: Negative. Respiratory: Negative. Cardiovascular: Negative. Gastrointestinal: Negative. Genitourinary: Negative. Musculoskeletal: Positive for myalgias. Skin: Negative. Neurological: Positive for tingling. Negative for dizziness, focal weakness, seizures, weakness and headaches. Psychiatric/Behavioral: Negative. Visit Vitals  /63 (BP 1 Location: Left upper arm, BP Patient Position: Sitting, BP Cuff Size: Adult)   Pulse 68   Temp 97.4 °F (36.3 °C) (Temporal)   Resp 17   Ht 5' 7\" (1.702 m)   Wt 246 lb 12.8 oz (111.9 kg)   SpO2 98%   BMI 38.65 kg/m²       Physical Exam  Vitals and nursing note reviewed. Constitutional:       Appearance: Normal appearance. He is obese. HENT:      Ears:      Comments: Dry flaking to bilateral canals. No erythema or drainage present. Mouth/Throat:      Mouth: Mucous membranes are moist.      Pharynx: Oropharynx is clear. Eyes:      Pupils: Pupils are equal, round, and reactive to light. Neck:      Vascular: No carotid bruit. Cardiovascular:      Rate and Rhythm: Normal rate and regular rhythm. Pulses: Normal pulses. Pulmonary:      Effort: Pulmonary effort is normal.      Breath sounds: Normal breath sounds.    Abdominal:      General: Bowel sounds are normal. Palpations: Abdomen is soft. Tenderness: There is no abdominal tenderness. There is no guarding. Musculoskeletal:         General: Normal range of motion. Right lower leg: No edema. Left lower leg: No edema. Lymphadenopathy:      Cervical: No cervical adenopathy. Skin:     General: Skin is warm and dry. Neurological:      Mental Status: He is alert and oriented to person, place, and time. Mental status is at baseline. 1. Type 2 diabetes mellitus with diabetic neuropathy, with long-term current use of insulin ([facility])  Lab Results   Component Value Date/Time    Hemoglobin A1c (POC) 6.9 06/03/2022 08:18 AM     Sugars are controlled   On Farxiga 5mg daily, Januvia 100mg daily, metformin 500mg twice daily (highest tolerated dose) and tresiba 30 units daily  Did not bring meter but reports fastings under 150  Needs new eye doctor and sending referral today  Check feet daily and notify provider immediately if wounds develop  Diabetic diet encouraged along with getting regular exercise 3-5 times weekly for 30-45 minutes    2. Severe obesity (BMI 35.0-35.9 with comorbidity) ([facility] [address]  Continue to encourage weight loss with diet and exercise as discussed above    3. Hyperlipidemia due to type 2 diabetes mellitus ([address]  Lab Results   Component Value Date/Time    LDL, calculated 19 01/07/2022 08:34 AM     Has been stable on crestor 20mg daily    4. Benign prostatic hyperplasia without lower urinary tract symptoms  Lab Results   Component Value Date/Time    Prostate Specific Ag 0.3 01/07/2022 08:43 AM     On flomax  And doing well   Started on keflex with Dr. Randolph Bah and will being managed by urology    5. Herpes  Stable and on valtrex prophylaxis    6. Anxiety and depression  Stable and on lexapro as directed    7. History of colon polyps  Colonoscopy updated 3/2022 and showed internal hemorrhoids  Repeat in 5 years with Dr. Summer Campbell    8.  Diastolic dysfunction  Stress test normal 3/2022  Echo 1/2022 showed grade 1 diastolic dysfunction  LV EF 60%  Mild to moderate mitral regurgitation and mild stenosis. Trace aortic regurg. Follow[facility]    9. Neuropathy  Continue on lyrica as directed for pain associated with neuropathy in hands and feet  dvised do not drive or operate machinery if experiencing drowsiness or otherwise feel impaired while taking medication. ORT score 2 and low risk   Controlled substance agreement discussed and signed   Understands proper use, storage and disposal of medication   Controlled Substance Monitoring:    RX Monitoring 1/6/2022   Periodic Controlled Substance Monitoring Possible medication side effects, risk of tolerance/dependence & alternative treatments discussed. ;No signs of potential drug abuse or diversion identified. 10.Hypothyroidism  Lab Results   Component Value Date/Time    TSH 2.450 01/07/2022 08:34 AM     On levothyroxine 50mcq daily   Repeat thyroid functions with labs    11. Encounter for immunization  - PNEUMOCOCCAL CONJ VACCINE 13 VALENT IM    11. Sensorineural hearing loss (SNHL) of both ears  Wears hearing aids    12. Dysfunction of both eustachian tubes  Recommend trial with flonase   He would like to see ENT and will send referral                 Patient verbalizes understanding of plan of care as discussed above    Follow-up and Dispositions    · Return in about 6 months (around 12/3/2022) for or sooner for worsening symptoms.

## 2022-06-03 NOTE — TELEPHONE ENCOUNTER
Attempted to reach Yvonne Medeiros to schedule appointment per referral and left a voicemail asking the patient to call back to schedule.

## 2022-07-12 ENCOUNTER — OFFICE VISIT (OUTPATIENT)
Dept: ENT CLINIC | Age: 77
End: 2022-07-12
Payer: COMMERCIAL

## 2022-07-12 VITALS
HEART RATE: 70 BPM | WEIGHT: 250 LBS | BODY MASS INDEX: 39.24 KG/M2 | RESPIRATION RATE: 18 BRPM | OXYGEN SATURATION: 97 % | SYSTOLIC BLOOD PRESSURE: 138 MMHG | TEMPERATURE: 97.1 F | HEIGHT: 67 IN | DIASTOLIC BLOOD PRESSURE: 74 MMHG

## 2022-07-12 DIAGNOSIS — R09.82 POST-NASAL DRAINAGE: ICD-10-CM

## 2022-07-12 DIAGNOSIS — H69.83 ETD (EUSTACHIAN TUBE DYSFUNCTION), BILATERAL: ICD-10-CM

## 2022-07-12 DIAGNOSIS — H92.03 OTALGIA OF BOTH EARS: Primary | ICD-10-CM

## 2022-07-12 DIAGNOSIS — H60.543 DERMATITIS OF EAR CANAL, BILATERAL: ICD-10-CM

## 2022-07-12 PROCEDURE — 1123F ACP DISCUSS/DSCN MKR DOCD: CPT | Performed by: OTOLARYNGOLOGY

## 2022-07-12 PROCEDURE — 99203 OFFICE O/P NEW LOW 30 MIN: CPT | Performed by: OTOLARYNGOLOGY

## 2022-07-12 RX ORDER — FLUOCINOLONE ACETONIDE 0.11 MG/ML
OIL AURICULAR (OTIC)
Qty: 20 ML | Refills: 1 | Status: SHIPPED | OUTPATIENT
Start: 2022-07-12

## 2022-07-12 RX ORDER — AZELASTINE 1 MG/ML
1 SPRAY, METERED NASAL
Qty: 1 EACH | Refills: 1 | Status: SHIPPED | OUTPATIENT
Start: 2022-07-12 | End: 2022-08-09

## 2022-07-12 NOTE — PROGRESS NOTES
Otolaryngology-Head and Neck Surgery  New Patient Visit     Patient: Flavia Amaya  YOB: 1945  MRN: [id number]  Date of Service: 7/12/2022    Chief Complaint:  Otalgia     History of Present Illness: Flavia Amaya is a 78y.o. year old male who presents today for discussion of bilateral otalgia    Notes bilateral intermittent otalgia for the last several months, after moving here from [place] increased PND as well  Ear discomfort lasts for a few hours and then resolved  Sharp radiating pain    Using antihistamine seems to help    Seen by PCP last month and started flonase - this has also improved ear symptoms, as well as nasal congestion significantly    Chronic hearing loss, with sig noise exposure over the years (prior )   Left ear better hearing ear - some hyperacusis   Had audiogram a few years ago        Past Medical History:  Past Medical History:   Diagnosis Date    Colon polyps     Diabetes ([address]     Hypercholesterolemia     Hypertension     Hypokalemia     Hypothyroidism     Kidney stones     Neuropathy     Prostatitis     Psychiatric disorder     depression/anxiety       Past Surgical History:   Past Surgical History:   Procedure Laterality Date    COLONOSCOPY  11/21/2013    12 polyps    COLONOSCOPY  12/15/2016    no ployps    COLONOSCOPY N/A 3/18/2022    COLONOSCOPY (ANES TIVA) performed by Dom Kaur MD at [facility] ENDOSCOPY    HX CATARACT REMOVAL       bilateral cataract surgery     HX LITHOTRIPSY      kidney stone removal       Medications:   Current Outpatient Medications   Medication Instructions    aspirin delayed-release 81 mg, Oral, DAILY    BD Ultra-Fine Short Pen Needle 31 gauge x 5/16\" ndle Use to check glucose 2 times a day    cephALEXin (KEFLEX) 500 mg, Oral, DAILY, Dr Cadet Number coenzyme Q-10 (CO Q-10) 200 mg, Oral, DAILY    colesevelam (WELCHOL) 625 mg, Oral, 2 TIMES DAILY WITH MEALS    escitalopram oxalate (LEXAPRO) 20 mg, Oral, DAILY    Farxiga 5 mg tab tablet TAKE 1 TABLET DAILY    fluticasone propionate (FLONASE) 50 mcg/actuation nasal spray 2 Sprays, Both Nostrils, DAILY    insulin degludec [place] FlexTouch U-100) 100 unit/mL (3 mL) inpn INJECT 30 UNITS            SUBCUTANEOUSLY NIGHTLY    levothyroxine (SYNTHROID) 50 mcg, Oral, DAILY BEFORE BREAKFAST    losartan (COZAAR) 25 mg, Oral, 2 TIMES DAILY    metFORMIN (GLUCOPHAGE) 500 mg, Oral, 2 TIMES DAILY WITH MEALS    metoprolol succinate (TOPROL-XL) 25 mg XL tablet No dose, route, or frequency recorded.  omega 3-DHA-EPA-fish oil (Fish Oil) 1,000 mg (120 mg-180 mg) capsule 1 Capsule, Oral, DAILY, OTC    polyethylene glycol (MIRALAX) 17 gram/dose powder Use as directed    potassium citrate (UROCIT-K) 15 mEq TbER tablet 15 mEq, Oral, DAILY, Take 1 tab  By mouth daily    pregabalin (LYRICA) 200 mg capsule TAKE 1 CAPSULE 3 TIMES A   DAY. MAXIMUM DAILY DOSE:   600MG    rosuvastatin (CRESTOR) 20 mg, Oral, DAILY    SITagliptin (JANUVIA) 100 mg, Oral, DAILY    tamsulosin (FLOMAX) 0.4 mg, Oral, DAILY    valACYclovir (VALTREX) 500 mg, Oral, 2 TIMES DAILY AS NEEDED       Allergies:    Allergies   Allergen Reactions    [place] ([facility]) Hives and Itching       Social History:   Social History     Tobacco Use    Smoking status: Never Smoker    Smokeless tobacco: Never Used   Vaping Use    Vaping Use: Never used   Substance Use Topics    Alcohol use: Not Currently     Alcohol/week: 1.0 standard drink     Types: 1 Cans of beer per week     Comment: ocassional    Drug use: Not Currently        Family History:  Family History   Problem Relation Age of Onset    Hypertension Mother     Heart Disease Mother     Heart Disease Father     Lung Cancer Father     No Known Problems Maternal Aunt     No Known Problems Maternal Uncle     No Known Problems Paternal Aunt     No Known Problems Paternal Uncle     No Known Problems Maternal Grandmother     No Known Problems Maternal Grandfather     No Known Problems Paternal Grandmother     No Known Problems Paternal Grandfather     No Known Problems Other        Review of Systems:    Consitutional: denies fever, excessive weight gain or loss. Eyes: denies diplopia, eye pain. Integumentary: denies new concerning skin lesions. Ears, Nose, Mouth, Throat: denies except as per HPI. Endocrine: denies hot or cold intolerance, increased thirst.  Respiratory: denies cough, hemoptysis, wheezing  Gastrointestinal: denies trouble swallowing, nausea, emesis, regurgitation  Musculoskeletal: denies muscle weakness or wasting  Cardiovascular: denies chest pain, shortness of breath  Neurologic: denies seizures, numbness or tingling, syncope  Hematologic: denies easy bleeding or bruising    Physical Examination:   Vitals:    07/12/22 1400   BP: 138/74   BP 1 Location: Left upper arm   BP Patient Position: Sitting   BP Cuff Size: Large adult   Pulse: 70   Temp: 97.1 °F (36.2 °C)   TempSrc: Temporal   Resp: 18   Height: 5' 7\" (1.702 m)   Weight: 250 lb (113.4 kg)   SpO2: 97%        General: Comfortable, pleasant, appears stated age  Voice: Strong, speaking in full sentences, no stridor    Face: No masses or lesions, facial strength symmetric   Ears: External ears unremarkable. Bilateral ear canal clear. Tympanic membrane clear and intact, with visible landmarks. Clear middle ear space  Nose: External nose unremarkable. Dorsum midline. Anterior rhinoscopy demonstrates no lesions. Septum midline. Turbinates without hypertrophy. Oral Cavity / Oropharynx: No trismus. Mucosa pink and moist. No lesions. Tongue is midline and mobile. Palate elevates symmetrically. Uvula midline. Tonsils unremarkable. Base of tongue soft. Floor of mouth soft. Neck: Supple. No adenopathy. Thyroid unremarkable. Palpable laryngeal landmarks. Full neck range of motion   Neurologic: CN II - XI intact.  Normal gait      Assessment and Plan: 1. Otalgia  2. PND  - Discussed with pt ear exam is normal  - As he notes associated PND, and improvement with flonase and antihistamines, discussed possible etiologies, including ETD  - Cont flonase and antihistamine  - Will add astelin as well  - He also describes itchy ears, trial dermotic  - Follow up in 1-2 months. Pending above, consider scope, consider repeat audiogram      The patient was instructed to return to clinic if no improvement or progression of symptoms. Signs to watch out for reviewed.       MD Lakshmi Tobar [facility]  [address] [place] [address]  Office Phone: [phone]

## 2022-07-12 NOTE — LETTER
7/13/2022    Patient: Rob Koroma   YOB: 1945   Date of Visit: 7/12/2022     Vita Tenorio NP  [id number] [facility]  Via In Basket    Dear Vita Tenorio NP,      Thank you for referring Mr. Melinda Singh to [facility] for evaluation. My notes for this consultation are attached. If you have questions, please do not hesitate to call me. I look forward to following your patient along with you.       Sincerely,    Shelly Bear MD

## 2022-07-12 NOTE — PROGRESS NOTES
Visit Vitals  /74 (BP 1 Location: Left upper arm, BP Patient Position: Sitting, BP Cuff Size: Large adult)   Pulse 70   Temp 97.1 °F (36.2 °C) (Temporal)   Resp 18   Ht 5' 7\" (1.702 m)   Wt 250 lb (113.4 kg)   SpO2 97%   BMI 39.16 kg/m²     Chief Complaint   Patient presents with    New Patient     Ear pain / Dysfunction of both eustachian tubes, mild trouble with balance

## 2022-08-09 RX ORDER — AZELASTINE 1 MG/ML
1 SPRAY, METERED NASAL
Qty: 1 EACH | Refills: 3 | Status: SHIPPED | OUTPATIENT
Start: 2022-08-09 | End: 2022-09-23

## 2022-08-15 DIAGNOSIS — E11.40 TYPE 2 DIABETES MELLITUS WITH DIABETIC NEUROPATHY, WITH LONG-TERM CURRENT USE OF INSULIN (HCC): ICD-10-CM

## 2022-08-15 DIAGNOSIS — Z79.4 TYPE 2 DIABETES MELLITUS WITH DIABETIC NEUROPATHY, WITH LONG-TERM CURRENT USE OF INSULIN (HCC): ICD-10-CM

## 2022-08-15 RX ORDER — METFORMIN HYDROCHLORIDE 500 MG/1
TABLET ORAL
Qty: 180 TABLET | Refills: 1 | Status: SHIPPED | OUTPATIENT
Start: 2022-08-15

## 2022-08-19 DIAGNOSIS — Z79.4 TYPE 2 DIABETES MELLITUS WITH DIABETIC NEUROPATHY, WITH LONG-TERM CURRENT USE OF INSULIN (HCC): ICD-10-CM

## 2022-08-19 DIAGNOSIS — E11.40 TYPE 2 DIABETES MELLITUS WITH DIABETIC NEUROPATHY, WITH LONG-TERM CURRENT USE OF INSULIN (HCC): ICD-10-CM

## 2022-08-19 RX ORDER — SITAGLIPTIN 100 MG/1
TABLET, FILM COATED ORAL
Qty: 90 TABLET | Refills: 0 | Status: SHIPPED | OUTPATIENT
Start: 2022-08-19 | End: 2022-10-27 | Stop reason: SDUPTHER

## 2022-08-25 DIAGNOSIS — B00.9 HERPES: ICD-10-CM

## 2022-08-25 RX ORDER — VALACYCLOVIR HYDROCHLORIDE 500 MG/1
TABLET, FILM COATED ORAL
Qty: 180 TABLET | Refills: 0 | Status: SHIPPED | OUTPATIENT
Start: 2022-08-25 | End: 2022-10-02

## 2022-09-23 RX ORDER — AZELASTINE 1 MG/ML
SPRAY, METERED NASAL
Qty: 1 EACH | Refills: 2 | Status: SHIPPED | OUTPATIENT
Start: 2022-09-23

## 2022-10-02 DIAGNOSIS — B00.9 HERPES: ICD-10-CM

## 2022-10-02 RX ORDER — VALACYCLOVIR HYDROCHLORIDE 500 MG/1
TABLET, FILM COATED ORAL
Qty: 180 TABLET | Refills: 0 | Status: SHIPPED | OUTPATIENT
Start: 2022-10-02

## 2022-10-11 ENCOUNTER — OFFICE VISIT (OUTPATIENT)
Dept: ENT CLINIC | Age: 77
End: 2022-10-11
Payer: COMMERCIAL

## 2022-10-11 VITALS
BODY MASS INDEX: 39.24 KG/M2 | SYSTOLIC BLOOD PRESSURE: 140 MMHG | HEART RATE: 61 BPM | HEIGHT: 67 IN | DIASTOLIC BLOOD PRESSURE: 80 MMHG | WEIGHT: 250 LBS | OXYGEN SATURATION: 98 % | RESPIRATION RATE: 20 BRPM

## 2022-10-11 DIAGNOSIS — H60.543 DERMATITIS OF EAR CANAL, BILATERAL: ICD-10-CM

## 2022-10-11 DIAGNOSIS — R09.82 POST-NASAL DRAINAGE: ICD-10-CM

## 2022-10-11 DIAGNOSIS — H69.83 ETD (EUSTACHIAN TUBE DYSFUNCTION), BILATERAL: ICD-10-CM

## 2022-10-11 DIAGNOSIS — H92.03 OTALGIA OF BOTH EARS: Primary | ICD-10-CM

## 2022-10-11 PROCEDURE — 99213 OFFICE O/P EST LOW 20 MIN: CPT | Performed by: OTOLARYNGOLOGY

## 2022-10-11 PROCEDURE — 1123F ACP DISCUSS/DSCN MKR DOCD: CPT | Performed by: OTOLARYNGOLOGY

## 2022-10-11 NOTE — PROGRESS NOTES
Otolaryngology-Head and Neck Surgery  Follow Up Patient Visit     Patient: Merna Perez  YOB: 1945  MRN: [id number]  Date of Service: 10/11/2022    Chief Complaint:  Otalgia     Interval hx: 10/11/2022  Chief Complaint   Patient presents with    Follow-up     2 month follow up        Doing better  Otalgia improved - has only had one or two episodes rather than more constant previously  Itching better as well with ears     History of Present Illness: Merna Perez is a 78y.o. year old male who presents today for discussion of bilateral otalgia    Notes bilateral intermittent otalgia for the last several months, after moving here from [place] increased PND as well  Ear discomfort lasts for a few hours and then resolved  Sharp radiating pain    Using antihistamine seems to help    Seen by PCP last month and started flonase - this has also improved ear symptoms, as well as nasal congestion significantly    Chronic hearing loss, with sig noise exposure over the years (prior )   Left ear better hearing ear - some hyperacusis   Had audiogram a few years ago        Past Medical History:  Past Medical History:   Diagnosis Date    Colon polyps     Diabetes ([address]     Hypercholesterolemia     Hypertension     Hypokalemia     Hypothyroidism     Kidney stones     Neuropathy     Prostatitis     Psychiatric disorder     depression/anxiety       Past Surgical History:   Past Surgical History:   Procedure Laterality Date    COLONOSCOPY  11/21/2013    12 polyps    COLONOSCOPY  12/15/2016    no ployps    COLONOSCOPY N/A 3/18/2022    COLONOSCOPY (ANES TIVA) performed by Juancho Coto MD at [facility] ENDOSCOPY    HX CATARACT REMOVAL       bilateral cataract surgery     HX LITHOTRIPSY      kidney stone removal       Medications:   Current Outpatient Medications   Medication Instructions    aspirin delayed-release 81 mg, Oral, DAILY    azelastine (ASTELIN) 137 mcg (0.1 %) nasal spray 1 SPRAY BY BOTH NOSTRILS ROUTE DAILY AS NEEDED FOR RHINITIS. BD Ultra-Fine Short Pen Needle 31 gauge x 5/16\" ndle Use to check glucose 2 times a day    cephALEXin (KEFLEX) 500 mg, Oral, DAILY, Dr Franklyn Payan    coenzyme Q-10 (CO Q-10) 200 mg, Oral, DAILY    colesevelam (WELCHOL) 625 mg, Oral, 2 TIMES DAILY WITH MEALS    escitalopram oxalate (LEXAPRO) 20 mg, Oral, DAILY    Farxiga 5 mg tab tablet TAKE 1 TABLET DAILY    fluocinolone acetonide oiL (DermOtic Oil) 0.01 % drop 1-2 drops each ear, every few weeks PRN itching    fluticasone propionate (FLONASE) 50 mcg/actuation nasal spray 2 Sprays, Both Nostrils, DAILY    insulin degludec Kaylin Severin FlexTouch U-100) 100 unit/mL (3 mL) inpn INJECT 30 UNITS            SUBCUTANEOUSLY NIGHTLY    Januvia 100 mg tablet TAKE 1 TABLET BY MOUTH EVERY DAY    levothyroxine (SYNTHROID) 50 mcg, Oral, DAILY BEFORE BREAKFAST    losartan (COZAAR) 25 mg, Oral, 2 TIMES DAILY    metFORMIN (GLUCOPHAGE) 500 mg tablet TAKE 1 TABLET BY MOUTH TWICE A DAY WITH MEALS    metoprolol succinate (TOPROL-XL) 25 mg XL tablet No dose, route, or frequency recorded. omega 3-DHA-EPA-fish oil (Fish Oil) 1,000 mg (120 mg-180 mg) capsule 1 Capsule, Oral, DAILY, OTC    polyethylene glycol (MIRALAX) 17 gram/dose powder Use as directed    potassium citrate (UROCIT-K) 15 mEq TbER tablet 15 mEq, Oral, DAILY, Take 1 tab  By mouth daily    pregabalin (LYRICA) 200 mg capsule TAKE 1 CAPSULE 3 TIMES A   DAY. MAXIMUM DAILY DOSE:   600MG    rosuvastatin (CRESTOR) 20 mg, Oral, DAILY    tamsulosin (FLOMAX) 0.4 mg, Oral, DAILY    valACYclovir (VALTREX) 500 mg tablet TAKE 1 TABLET BY MOUTH 2 TIMES DAILY AS NEEDED FOR HERPES       Allergies:    Allergies   Allergen Reactions    [place] ([facility]) Hives and Itching       Social History:   Social History     Tobacco Use    Smoking status: Never    Smokeless tobacco: Never   Vaping Use    Vaping Use: Never used   Substance Use Topics    Alcohol use: Not Currently     Alcohol/week: 1.0 standard drink     Types: 1 Cans of beer per week     Comment: ocassional    Drug use: Not Currently        Family History:  Family History   Problem Relation Age of Onset    Hypertension Mother     Heart Disease Mother     Heart Disease Father     Lung Cancer Father     No Known Problems Maternal Aunt     No Known Problems Maternal Uncle     No Known Problems Paternal Aunt     No Known Problems Paternal Uncle     No Known Problems Maternal Grandmother     No Known Problems Maternal Grandfather     No Known Problems Paternal Grandmother     No Known Problems Paternal Grandfather     No Known Problems Other        Review of Systems:    Consitutional: denies fever, excessive weight gain or loss. Eyes: denies diplopia, eye pain. Integumentary: denies new concerning skin lesions. Ears, Nose, Mouth, Throat: denies except as per HPI. Endocrine: denies hot or cold intolerance, increased thirst.  Respiratory: denies cough, hemoptysis, wheezing  Gastrointestinal: denies trouble swallowing, nausea, emesis, regurgitation  Musculoskeletal: denies muscle weakness or wasting  Cardiovascular: denies chest pain, shortness of breath  Neurologic: denies seizures, numbness or tingling, syncope  Hematologic: denies easy bleeding or bruising    Physical Examination:   Vitals:    10/11/22 0928   Height: 5' 7\" (1.702 m)        General: Comfortable, pleasant, appears stated age  Voice: Strong, speaking in full sentences, no stridor    Face: No masses or lesions, facial strength symmetric   Ears: External ears unremarkable. Bilateral ear canal clear. Tympanic membrane clear and intact, with visible landmarks. Clear middle ear space  Nose: External nose unremarkable. Dorsum midline. Anterior rhinoscopy demonstrates no lesions. Septum midline. Turbinates without hypertrophy. Oral Cavity / Oropharynx: No trismus. Mucosa pink and moist. No lesions. Tongue is midline and mobile. Palate elevates symmetrically. Uvula midline. Tonsils unremarkable. Base of tongue soft. Floor of mouth soft. Neck: Supple. No adenopathy. Thyroid unremarkable. Palpable laryngeal landmarks. Full neck range of motion   Neurologic: CN II - XI intact. Normal gait      Assessment and Plan:   Otalgia  PND  Ear canal dermatitis   Hearing loss  - Suspect otalgia related to ETD   - Cont flonase + astelin  - Can try and wean astelin to PRN use if doing well consistently  - Cont PRN dermotic for ear itching  - Pt has hearing aids and prior audiogram through local vendor. He will consider updating audiogram and HA through them; I asked him to keep a copy for our review if needed  - Follow up 6 months      The patient was instructed to return to clinic if no improvement or progression of symptoms. Signs to watch out for reviewed.       Kenneth Gitelman, MD [facility]  [address] [place] [address]  Office Phone: [phone]

## 2022-10-11 NOTE — PROGRESS NOTES
Chief Complaint   Patient presents with    Follow-up     2 month follow up          Visit Vitals  Resp 20   Ht 5' 7\" (1.702 m)   Wt 250 lb (113.4 kg)   BMI 39.16 kg/m²

## 2022-10-27 DIAGNOSIS — E11.40 TYPE 2 DIABETES MELLITUS WITH DIABETIC NEUROPATHY, WITH LONG-TERM CURRENT USE OF INSULIN (HCC): ICD-10-CM

## 2022-10-27 DIAGNOSIS — Z79.4 TYPE 2 DIABETES MELLITUS WITH DIABETIC NEUROPATHY, WITH LONG-TERM CURRENT USE OF INSULIN (HCC): ICD-10-CM

## 2022-10-31 DIAGNOSIS — E11.40 TYPE 2 DIABETES MELLITUS WITH DIABETIC NEUROPATHY, WITH LONG-TERM CURRENT USE OF INSULIN (HCC): ICD-10-CM

## 2022-10-31 DIAGNOSIS — Z79.4 TYPE 2 DIABETES MELLITUS WITH DIABETIC NEUROPATHY, WITH LONG-TERM CURRENT USE OF INSULIN (HCC): ICD-10-CM

## 2022-11-01 RX ORDER — PREGABALIN 200 MG/1
CAPSULE ORAL
Qty: 270 CAPSULE | Refills: 0 | Status: SHIPPED | OUTPATIENT
Start: 2022-11-01

## 2022-11-17 DIAGNOSIS — F32.A ANXIETY AND DEPRESSION: ICD-10-CM

## 2022-11-17 DIAGNOSIS — F41.9 ANXIETY AND DEPRESSION: ICD-10-CM

## 2022-11-17 DIAGNOSIS — E78.5 HYPERLIPIDEMIA DUE TO TYPE 2 DIABETES MELLITUS (HCC): ICD-10-CM

## 2022-11-17 DIAGNOSIS — E03.9 ACQUIRED HYPOTHYROIDISM: ICD-10-CM

## 2022-11-17 DIAGNOSIS — E11.69 HYPERLIPIDEMIA DUE TO TYPE 2 DIABETES MELLITUS (HCC): ICD-10-CM

## 2022-11-17 RX ORDER — ROSUVASTATIN CALCIUM 20 MG/1
TABLET, COATED ORAL
Qty: 90 TABLET | Refills: 1 | Status: SHIPPED | OUTPATIENT
Start: 2022-11-17

## 2022-11-17 RX ORDER — LEVOTHYROXINE SODIUM 50 UG/1
TABLET ORAL
Qty: 90 TABLET | Refills: 1 | Status: SHIPPED | OUTPATIENT
Start: 2022-11-17

## 2022-11-17 RX ORDER — ESCITALOPRAM OXALATE 20 MG/1
TABLET ORAL
Qty: 90 TABLET | Refills: 1 | Status: SHIPPED | OUTPATIENT
Start: 2022-11-17

## 2022-11-28 DIAGNOSIS — Z79.4 TYPE 2 DIABETES MELLITUS WITH DIABETIC NEUROPATHY, WITH LONG-TERM CURRENT USE OF INSULIN (HCC): ICD-10-CM

## 2022-11-28 DIAGNOSIS — E11.40 TYPE 2 DIABETES MELLITUS WITH DIABETIC NEUROPATHY, WITH LONG-TERM CURRENT USE OF INSULIN (HCC): ICD-10-CM

## 2022-11-28 RX ORDER — PEN NEEDLE, DIABETIC 31 GX5/16"
NEEDLE, DISPOSABLE MISCELLANEOUS
Qty: 100 EACH | Refills: 5 | Status: SHIPPED | OUTPATIENT
Start: 2022-11-28

## 2022-12-06 ENCOUNTER — OFFICE VISIT (OUTPATIENT)
Dept: PRIMARY CARE CLINIC | Age: 77
End: 2022-12-06
Payer: COMMERCIAL

## 2022-12-06 VITALS
WEIGHT: 253.8 LBS | DIASTOLIC BLOOD PRESSURE: 67 MMHG | RESPIRATION RATE: 18 BRPM | SYSTOLIC BLOOD PRESSURE: 132 MMHG | HEART RATE: 66 BPM | HEIGHT: 67 IN | BODY MASS INDEX: 39.83 KG/M2 | TEMPERATURE: 97.3 F | OXYGEN SATURATION: 99 %

## 2022-12-06 DIAGNOSIS — E78.5 HYPERLIPIDEMIA DUE TO TYPE 2 DIABETES MELLITUS (HCC): ICD-10-CM

## 2022-12-06 DIAGNOSIS — B00.9 HERPES: ICD-10-CM

## 2022-12-06 DIAGNOSIS — Z86.010 HISTORY OF COLON POLYPS: ICD-10-CM

## 2022-12-06 DIAGNOSIS — N40.0 BENIGN PROSTATIC HYPERPLASIA WITHOUT LOWER URINARY TRACT SYMPTOMS: ICD-10-CM

## 2022-12-06 DIAGNOSIS — G62.9 NEUROPATHY: ICD-10-CM

## 2022-12-06 DIAGNOSIS — F32.A ANXIETY AND DEPRESSION: ICD-10-CM

## 2022-12-06 DIAGNOSIS — Z79.4 TYPE 2 DIABETES MELLITUS WITH DIABETIC NEUROPATHY, WITH LONG-TERM CURRENT USE OF INSULIN (HCC): Primary | ICD-10-CM

## 2022-12-06 DIAGNOSIS — E03.9 ACQUIRED HYPOTHYROIDISM: ICD-10-CM

## 2022-12-06 DIAGNOSIS — H90.3 SENSORINEURAL HEARING LOSS (SNHL) OF BOTH EARS: ICD-10-CM

## 2022-12-06 DIAGNOSIS — H69.83 DYSFUNCTION OF BOTH EUSTACHIAN TUBES: ICD-10-CM

## 2022-12-06 DIAGNOSIS — I51.89 DIASTOLIC DYSFUNCTION: ICD-10-CM

## 2022-12-06 DIAGNOSIS — E66.01 SEVERE OBESITY (BMI 35.0-35.9 WITH COMORBIDITY) (HCC): ICD-10-CM

## 2022-12-06 DIAGNOSIS — E11.69 HYPERLIPIDEMIA DUE TO TYPE 2 DIABETES MELLITUS (HCC): ICD-10-CM

## 2022-12-06 DIAGNOSIS — E11.40 TYPE 2 DIABETES MELLITUS WITH DIABETIC NEUROPATHY, WITH LONG-TERM CURRENT USE OF INSULIN (HCC): Primary | ICD-10-CM

## 2022-12-06 DIAGNOSIS — F41.9 ANXIETY AND DEPRESSION: ICD-10-CM

## 2022-12-06 LAB — HBA1C MFR BLD HPLC: 7.6 %

## 2022-12-06 PROCEDURE — 3074F SYST BP LT 130 MM HG: CPT | Performed by: NURSE PRACTITIONER

## 2022-12-06 PROCEDURE — 3051F HG A1C>EQUAL 7.0%<8.0%: CPT | Performed by: NURSE PRACTITIONER

## 2022-12-06 PROCEDURE — 99214 OFFICE O/P EST MOD 30 MIN: CPT | Performed by: NURSE PRACTITIONER

## 2022-12-06 PROCEDURE — 3078F DIAST BP <80 MM HG: CPT | Performed by: NURSE PRACTITIONER

## 2022-12-06 PROCEDURE — 83036 HEMOGLOBIN GLYCOSYLATED A1C: CPT | Performed by: NURSE PRACTITIONER

## 2022-12-06 PROCEDURE — 1123F ACP DISCUSS/DSCN MKR DOCD: CPT | Performed by: NURSE PRACTITIONER

## 2022-12-06 NOTE — PROGRESS NOTES
Tamy Carl is a 78 y.o. male who presents to the office today for the following:    Chief Complaint   Patient presents with    Diabetes       Past Medical History:   Diagnosis Date    Colon polyps     Diabetes ([address]     Hypercholesterolemia     Hypertension     Hypokalemia     Hypothyroidism     Kidney stones     Neuropathy     Prostatitis     Psychiatric disorder     depression/anxiety       Past Surgical History:   Procedure Laterality Date    COLONOSCOPY  11/21/2013    12 polyps    COLONOSCOPY  12/15/2016    no ployps    COLONOSCOPY N/A 3/18/2022    COLONOSCOPY (ANES TIVA) performed by Irvin Coppola MD at [facility] ENDOSCOPY    HX CATARACT REMOVAL       bilateral cataract surgery     HX LITHOTRIPSY      kidney stone removal        Family History   Problem Relation Age of Onset    Hypertension Mother     Heart Disease Mother     Heart Disease Father     Lung Cancer Father     No Known Problems Maternal Aunt     No Known Problems Maternal Uncle     No Known Problems Paternal Aunt     No Known Problems Paternal Uncle     No Known Problems Maternal Grandmother     No Known Problems Maternal Grandfather     No Known Problems Paternal Grandmother     No Known Problems Paternal Grandfather     No Known Problems Other         Social History     Tobacco Use    Smoking status: Never    Smokeless tobacco: Never   Vaping Use    Vaping Use: Never used   Substance Use Topics    Alcohol use: Not Currently     Alcohol/week: 1.0 standard drink     Types: 1 Cans of beer per week     Comment: ocassional    Drug use: Not Currently      HPI  Patient here today for follow up of chronic conditions with PMH of type 2 diabetes, hyperlipidemia, diastolic dysfunction, BPH, neuropathy, hypothyroidism, herpes,anxiety/depression, ETD,colon polyps and obesity. Reports taking medications as noted in chart. Has seen cardiology, urology and ENT. Has been feeling well with no specific changes or concerns.        Current Outpatient Medications on File Prior to Visit   Medication Sig    BD Ultra-Fine Short Pen Needle 31 gauge x 5/16\" ndle Use to check glucose 2 times a day    rosuvastatin (CRESTOR) 20 mg tablet TAKE 1 TABLET DAILY    escitalopram oxalate (LEXAPRO) 20 mg tablet TAKE 1 TABLET DAILY    synthroid 50 mcg tablet TAKE 1 TABLET DAILY BEFORE BREAKFAST    pregabalin (LYRICA) 200 mg capsule TAKE 1 CAPSULE 3 TIMES A   DAY. MAXIMUM DAILY DOSE:   600MG    SITagliptin (Januvia) 100 mg tablet Take 1 Tablet by mouth daily. valACYclovir (VALTREX) 500 mg tablet TAKE 1 TABLET BY MOUTH 2 TIMES DAILY AS NEEDED FOR HERPES    azelastine (ASTELIN) 137 mcg (0.1 %) nasal spray 1 SPRAY BY BOTH NOSTRILS ROUTE DAILY AS NEEDED FOR RHINITIS.    metFORMIN (GLUCOPHAGE) 500 mg tablet TAKE 1 TABLET BY MOUTH TWICE A DAY WITH MEALS    Farxiga 5 mg tab tablet TAKE 1 TABLET DAILY    fluocinolone acetonide oiL (DermOtic Oil) 0.01 % drop 1-2 drops each ear, every few weeks PRN itching    insulin degludec [place]pe FlexTouch U-100) 100 unit/mL (3 mL) inpn INJECT 30 UNITS            SUBCUTANEOUSLY NIGHTLY    fluticasone propionate (FLONASE) 50 mcg/actuation nasal spray 2 Sprays by Both Nostrils route daily. [DISCONTINUED] polyethylene glycol (MIRALAX) 17 gram/dose powder Use as directed  Indications: emptying of the bowel    potassium citrate (UROCIT-K) 15 mEq TbER tablet Take 1 Tablet by mouth daily. Take 1 tab  By mouth daily (Patient taking differently: Take 15 mEq by mouth daily. DR Meron Morales)    tamsulosin (Flomax) 0.4 mg capsule Take 1 Capsule by mouth daily. (Patient taking differently: Take 0.4 mg by mouth daily. Dr Becca Kelley)    metoprolol succinate (TOPROL-XL) 25 mg XL tablet     losartan (COZAAR) 25 mg tablet Take 25 mg by mouth two (2) times a day. [facility]) 625 mg tablet Take 625 mg by mouth two (2) times daily (with meals). cephALEXin (KEFLEX) 500 mg capsule Take 500 mg by mouth daily.  Dr Meron Morales    coenzyme Q-10 (CO Q-10) 200 mg capsule Take 200 mg by mouth daily. aspirin delayed-release 81 mg tablet Take 81 mg by mouth daily. [DISCONTINUED] omega 3-DHA-EPA-fish oil 1,000 mg (120 mg-180 mg) capsule Take 1 Capsule by mouth daily. OTC     No current facility-administered medications on file prior to visit. No orders of the defined types were placed in this encounter. Review of Systems   Constitutional: Negative. HENT:  Positive for hearing loss. Negative for ear discharge, nosebleeds, sinus pain and sore throat. Eyes: Negative. Respiratory: Negative. Negative for stridor. Cardiovascular: Negative. Gastrointestinal: Negative. Genitourinary: Negative. Musculoskeletal:  Positive for myalgias. Skin: Negative. Neurological:  Positive for tingling. Negative for dizziness, focal weakness, seizures, weakness and headaches. Psychiatric/Behavioral: Negative. Visit Vitals  /67 (BP 1 Location: Left upper arm, BP Patient Position: Sitting, BP Cuff Size: Large adult)   Pulse 66   Temp 97.3 °F (36.3 °C) (Temporal)   Resp 18   Ht 5' 7\" (1.702 m)   Wt 253 lb 12.8 oz (115.1 kg)   SpO2 99%   BMI 39.75 kg/m²       Physical Exam  Vitals and nursing note reviewed. Constitutional:       Appearance: Normal appearance. He is obese. HENT:      Ears:      Comments: Dry flaking to bilateral canals. No erythema or drainage present. Mouth/Throat:      Mouth: Mucous membranes are moist.      Pharynx: Oropharynx is clear. Eyes:      Pupils: Pupils are equal, round, and reactive to light. Neck:      Vascular: No carotid bruit. Cardiovascular:      Rate and Rhythm: Normal rate and regular rhythm. Pulses: Normal pulses. Pulmonary:      Effort: Pulmonary effort is normal.      Breath sounds: Normal breath sounds. Abdominal:      General: Bowel sounds are normal.      Palpations: Abdomen is soft. Tenderness: There is no abdominal tenderness. There is no guarding.    Musculoskeletal: General: Normal range of motion. Right lower leg: No edema. Left lower leg: No edema. Lymphadenopathy:      Cervical: No cervical adenopathy. Skin:     General: Skin is warm and dry. Neurological:      Mental Status: He is alert and oriented to person, place, and time. Mental status is at baseline. 1. Type 2 diabetes mellitus with diabetic neuropathy, with long-term current use of insulin ([facility]  Lab Results   Component Value Date/Time    Hemoglobin A1c (POC) 7.6 12/06/2022 08:33 AM     Glucose remains essentially controlled but would like closer to a1c of 7.0 or less  Average readings under 150s fasting with few outliers at 180-200. On Farxiga 5mg daily, Januvia 100mg daily, metformin 500mg twice daily (highest tolerated dose) and tresiba 30 units daily  Check glucose fasting daily and notify provider if > 200 or < 70 consistently  Not up to date on diabetic eye exam but does have appointment upcoming  Check feet daily and notify provider immediately if wounds develop  Diabetic diet encouraged along with getting regular exercise 3-5 times weekly for 30-45 minutes  Updating fasting labs today    2. Severe obesity (BMI 35.0-35.9 with comorbidity) ([facility]  Continue to encourage weight loss with diet and exercise as discussed above    3. Hyperlipidemia due to type 2 diabetes mellitus ([address]  Has been stable on crestor 20mg daily    4. Benign prostatic hyperplasia without lower urinary tract symptoms  Lab Results   Component Value Date/Time    Prostate Specific Ag 0.3 01/07/2022 08:43 AM     On flomax  And doing well   Being managed by urology    5. Herpes  Stable and on valtrex prophylaxis    6. Anxiety and depression  Stable and on lexapro as directed    7. History of colon polyps  Colonoscopy updated 3/2022 and showed internal hemorrhoids  Repeat in 5 years with Dr. Ca Hwang    8.  Diastolic dysfunction  Stress test normal 3/2022  Echo 1/2022 showed grade 1 diastolic dysfunction  LV EF 60%  Mild to moderate mitral regurgitation and mild stenosis. Trace aortic regurg. Follows with [facility]    9. Neuropathy  Continue on lyrica as directed for pain associated with neuropathy in hands and feet  dvised do not drive or operate machinery if experiencing drowsiness or otherwise feel impaired while taking medication. ORT score 2 and low risk   Controlled substance agreement discussed and signed   Understands proper use, storage and disposal of medication   Controlled Substance Monitoring:    RX Monitoring 12/6/2022   Periodic Controlled Substance Monitoring Possible medication side effects, risk of tolerance/dependence & alternative treatments discussed. ;No signs of potential drug abuse or diversion identified. 10.Hypothyroidism  Lab Results   Component Value Date/Time    TSH 2.450 01/07/2022 08:34 AM     On levothyroxine 50mcq daily   Repeat thyroid functions with labs    11. Sensorineural hearing loss (SNHL) of both ears  Wears hearing aids    12. Dysfunction of both eustachian tubes  Improved and continues on flonase and azelestine prn  Did have eval with ENT who continued similar management      Patient verbalizes understanding of plan of care as discussed above    Follow-up and Dispositions    Return in about 6 months (around 6/6/2023) for or sooner for worsening symptoms.

## 2022-12-06 NOTE — PROGRESS NOTES
Chief Complaint   Patient presents with    Diabetes     Follow up     1. \"Have you been to the ER, urgent care clinic since your last visit? Hospitalized since your last visit? \" No    2. \"Have you seen or consulted any other health care providers outside of the [address] [place] [address] since your last visit? \" No     3. For patients aged 39-70: Has the patient had a colonoscopy / FIT/ Cologuard? Yes - no Care Gap present      If the patient is female:    4. For patients aged 41-77: Has the patient had a mammogram within the past 2 years? NA - based on age or sex      11. For patients aged 21-65: Has the patient had a pap smear?  NA - based on age or sex

## 2022-12-07 LAB
ALBUMIN SERPL-MCNC: 4.3 G/DL (ref 3.7–4.7)
ALBUMIN/CREAT UR: 99 MG/G CREAT (ref 0–29)
ALBUMIN/GLOB SERPL: 1.7 {RATIO} (ref 1.2–2.2)
ALP SERPL-CCNC: 55 IU/L (ref 44–121)
ALT SERPL-CCNC: 18 IU/L (ref 0–44)
APPEARANCE UR: CLEAR
AST SERPL-CCNC: 16 IU/L (ref 0–40)
BASOPHILS # BLD AUTO: 0 X10E3/UL (ref 0–0.2)
BASOPHILS NFR BLD AUTO: 1 %
BILIRUB SERPL-MCNC: 0.6 MG/DL (ref 0–1.2)
BILIRUB UR QL STRIP: NEGATIVE
BUN SERPL-MCNC: 15 MG/DL (ref 8–27)
BUN/CREAT SERPL: 13 (ref 10–24)
CALCIUM SERPL-MCNC: 9.4 MG/DL (ref 8.6–10.2)
CHLORIDE SERPL-SCNC: 100 MMOL/L (ref 96–106)
CHOLEST SERPL-MCNC: 115 MG/DL (ref 100–199)
CO2 SERPL-SCNC: 22 MMOL/L (ref 20–29)
COLOR UR: YELLOW
CREAT SERPL-MCNC: 1.14 MG/DL (ref 0.76–1.27)
CREAT UR-MCNC: 56.7 MG/DL
EGFR: 67 ML/MIN/1.73
EOSINOPHIL # BLD AUTO: 0.4 X10E3/UL (ref 0–0.4)
EOSINOPHIL NFR BLD AUTO: 5 %
ERYTHROCYTE [DISTWIDTH] IN BLOOD BY AUTOMATED COUNT: 13.4 % (ref 11.6–15.4)
GLOBULIN SER CALC-MCNC: 2.5 G/DL (ref 1.5–4.5)
GLUCOSE SERPL-MCNC: 118 MG/DL (ref 70–99)
GLUCOSE UR QL STRIP: ABNORMAL
HCT VFR BLD AUTO: 50.2 % (ref 37.5–51)
HDLC SERPL-MCNC: 40 MG/DL
HGB BLD-MCNC: 17 G/DL (ref 13–17.7)
HGB UR QL STRIP: NEGATIVE
IMM GRANULOCYTES # BLD AUTO: 0 X10E3/UL (ref 0–0.1)
IMM GRANULOCYTES NFR BLD AUTO: 0 %
KETONES UR QL STRIP: NEGATIVE
LDLC SERPL CALC-MCNC: 41 MG/DL (ref 0–99)
LEUKOCYTE ESTERASE UR QL STRIP: NEGATIVE
LYMPHOCYTES # BLD AUTO: 2.2 X10E3/UL (ref 0.7–3.1)
LYMPHOCYTES NFR BLD AUTO: 33 %
MCH RBC QN AUTO: 30.2 PG (ref 26.6–33)
MCHC RBC AUTO-ENTMCNC: 33.9 G/DL (ref 31.5–35.7)
MCV RBC AUTO: 89 FL (ref 79–97)
MICRO URNS: ABNORMAL
MICROALBUMIN UR-MCNC: 56 UG/ML
MONOCYTES # BLD AUTO: 0.5 X10E3/UL (ref 0.1–0.9)
MONOCYTES NFR BLD AUTO: 7 %
NEUTROPHILS # BLD AUTO: 3.7 X10E3/UL (ref 1.4–7)
NEUTROPHILS NFR BLD AUTO: 54 %
NITRITE UR QL STRIP: NEGATIVE
PH UR STRIP: 6 [PH] (ref 5–7.5)
PLATELET # BLD AUTO: 139 X10E3/UL (ref 150–450)
POTASSIUM SERPL-SCNC: 4.9 MMOL/L (ref 3.5–5.2)
PROT SERPL-MCNC: 6.8 G/DL (ref 6–8.5)
PROT UR QL STRIP: ABNORMAL
RBC # BLD AUTO: 5.62 X10E6/UL (ref 4.14–5.8)
SODIUM SERPL-SCNC: 139 MMOL/L (ref 134–144)
SP GR UR STRIP: 1.02 (ref 1–1.03)
TRIGL SERPL-MCNC: 212 MG/DL (ref 0–149)
TSH SERPL DL<=0.005 MIU/L-ACNC: 3.22 UIU/ML (ref 0.45–4.5)
UROBILINOGEN UR STRIP-MCNC: 0.2 MG/DL (ref 0.2–1)
VLDLC SERPL CALC-MCNC: 34 MG/DL (ref 5–40)
WBC # BLD AUTO: 6.8 X10E3/UL (ref 3.4–10.8)

## 2022-12-16 RX ORDER — AZELASTINE 1 MG/ML
SPRAY, METERED NASAL
Qty: 1 EACH | Refills: 2 | Status: SHIPPED | OUTPATIENT
Start: 2022-12-16

## 2023-01-10 ENCOUNTER — TRANSCRIBE ORDER (OUTPATIENT)
Dept: SCHEDULING | Age: 78
End: 2023-01-10

## 2023-01-10 DIAGNOSIS — R01.1 HEART MURMUR: Primary | ICD-10-CM

## 2023-01-18 ENCOUNTER — HOSPITAL ENCOUNTER (OUTPATIENT)
Dept: VASCULAR SURGERY | Age: 78
Discharge: HOME | End: 2023-01-18
Attending: NURSE PRACTITIONER
Payer: COMMERCIAL

## 2023-01-18 VITALS — WEIGHT: 249 LBS | BODY MASS INDEX: 36.88 KG/M2 | HEIGHT: 69 IN

## 2023-01-18 DIAGNOSIS — R01.1 HEART MURMUR: ICD-10-CM

## 2023-01-18 LAB
ECHO AO ROOT DIAM: 4.2 CM
ECHO AO ROOT INDEX: 1.85 CM/M2
ECHO AO SINUS VALSALVA DIAM: 4.1 CM
ECHO AO SINUS VALSALVA INDEX: 1.81 CM/M2
ECHO AV AREA PEAK VELOCITY: 2.4 CM2
ECHO AV AREA VTI: 2.8 CM2
ECHO AV AREA/BSA PEAK VELOCITY: 1.1 CM2/M2
ECHO AV AREA/BSA VTI: 1.2 CM2/M2
ECHO AV MEAN GRADIENT: 5 MMHG
ECHO AV MEAN VELOCITY: 1 M/S
ECHO AV PEAK GRADIENT: 9 MMHG
ECHO AV PEAK VELOCITY: 1.5 M/S
ECHO AV VELOCITY RATIO: 0.67
ECHO AV VTI: 32.2 CM
ECHO LA DIAMETER INDEX: 1.98 CM/M2
ECHO LA DIAMETER: 4.5 CM
ECHO LA TO AORTIC ROOT RATIO: 1.07
ECHO LA VOL 4C: 28 ML (ref 18–58)
ECHO LA VOLUME INDEX A4C: 12 ML/M2 (ref 16–34)
ECHO LV E' LATERAL VELOCITY: 8 CM/S
ECHO LV E' SEPTAL VELOCITY: 4 CM/S
ECHO LV EDV A2C: 41 ML
ECHO LV EDV A4C: 75 ML
ECHO LV EDV BP: 55 ML (ref 67–155)
ECHO LV EDV INDEX A4C: 33 ML/M2
ECHO LV EDV INDEX BP: 24 ML/M2
ECHO LV EDV NDEX A2C: 18 ML/M2
ECHO LV EJECTION FRACTION A2C: 64 %
ECHO LV EJECTION FRACTION A4C: 60 %
ECHO LV EJECTION FRACTION BIPLANE: 61 % (ref 55–100)
ECHO LV ESV A2C: 15 ML
ECHO LV ESV A4C: 30 ML
ECHO LV ESV BP: 22 ML (ref 22–58)
ECHO LV ESV INDEX A2C: 7 ML/M2
ECHO LV ESV INDEX A4C: 13 ML/M2
ECHO LV ESV INDEX BP: 10 ML/M2
ECHO LV FRACTIONAL SHORTENING: 51 % (ref 28–44)
ECHO LV GLOBAL LONGITUDINAL STRAIN (GLS): -12.9 %
ECHO LV INTERNAL DIMENSION DIASTOLE INDEX: 1.54 CM/M2
ECHO LV INTERNAL DIMENSION DIASTOLIC: 3.5 CM (ref 4.2–5.9)
ECHO LV INTERNAL DIMENSION SYSTOLIC INDEX: 0.75 CM/M2
ECHO LV INTERNAL DIMENSION SYSTOLIC: 1.7 CM
ECHO LV IVSD: 1 CM (ref 0.6–1)
ECHO LV MASS 2D: 111 G (ref 88–224)
ECHO LV MASS INDEX 2D: 48.9 G/M2 (ref 49–115)
ECHO LV POSTERIOR WALL DIASTOLIC: 1.1 CM (ref 0.6–1)
ECHO LV RELATIVE WALL THICKNESS RATIO: 0.63
ECHO LVOT AREA: 3.5 CM2
ECHO LVOT AV VTI INDEX: 0.78
ECHO LVOT DIAM: 2.1 CM
ECHO LVOT MEAN GRADIENT: 2 MMHG
ECHO LVOT PEAK GRADIENT: 4 MMHG
ECHO LVOT PEAK VELOCITY: 1 M/S
ECHO LVOT STROKE VOLUME INDEX: 38.1 ML/M2
ECHO LVOT SV: 86.5 ML
ECHO LVOT VTI: 25 CM
ECHO MV AREA VTI: 2.1 CM2
ECHO MV LVOT VTI INDEX: 1.66
ECHO MV MAX VELOCITY: 2 M/S
ECHO MV MEAN GRADIENT: 7 MMHG
ECHO MV MEAN VELOCITY: 1.3 M/S
ECHO MV PEAK GRADIENT: 16 MMHG
ECHO MV VTI: 41.4 CM
ECHO RA AREA 4C: 12.2 CM2
ECHO RV INTERNAL DIMENSION: 3 CM
ECHO RV TAPSE: 2.5 CM (ref 1.7–?)

## 2023-01-18 PROCEDURE — 93306 TTE W/DOPPLER COMPLETE: CPT

## 2023-02-06 DIAGNOSIS — B00.9 HERPES: ICD-10-CM

## 2023-02-06 RX ORDER — VALACYCLOVIR HYDROCHLORIDE 500 MG/1
TABLET, FILM COATED ORAL
Qty: 180 TABLET | Refills: 1 | Status: SHIPPED | OUTPATIENT
Start: 2023-02-06

## 2023-02-10 RX ORDER — TAMSULOSIN HYDROCHLORIDE 0.4 MG/1
CAPSULE ORAL
Qty: 90 CAPSULE | Refills: 3 | Status: SHIPPED | OUTPATIENT
Start: 2023-02-10

## 2023-03-02 ENCOUNTER — HOSPITAL ENCOUNTER (OUTPATIENT)
Dept: ULTRASOUND IMAGING | Age: 78
Discharge: HOME | End: 2023-03-02
Attending: NURSE PRACTITIONER
Payer: COMMERCIAL

## 2023-03-02 DIAGNOSIS — N20.0 KIDNEY STONES: ICD-10-CM

## 2023-03-02 PROCEDURE — 76775 US EXAM ABDO BACK WALL LIM: CPT

## 2023-03-15 RX ORDER — AZELASTINE 1 MG/ML
SPRAY, METERED NASAL
Qty: 1 EACH | Refills: 1 | Status: SHIPPED | OUTPATIENT
Start: 2023-03-15

## 2023-03-22 ENCOUNTER — OFFICE VISIT (OUTPATIENT)
Dept: UROLOGY | Age: 78
End: 2023-03-22

## 2023-03-22 VITALS
OXYGEN SATURATION: 99 % | SYSTOLIC BLOOD PRESSURE: 142 MMHG | BODY MASS INDEX: 37.03 KG/M2 | WEIGHT: 250 LBS | HEIGHT: 69 IN | RESPIRATION RATE: 16 BRPM | HEART RATE: 69 BPM | TEMPERATURE: 97.9 F | DIASTOLIC BLOOD PRESSURE: 78 MMHG

## 2023-03-22 DIAGNOSIS — E66.01 SEVERE OBESITY (BMI 35.0-39.9) WITH COMORBIDITY (HCC): ICD-10-CM

## 2023-03-22 DIAGNOSIS — N20.0 KIDNEY STONES: Primary | ICD-10-CM

## 2023-03-22 DIAGNOSIS — N40.1 BPH WITH OBSTRUCTION/LOWER URINARY TRACT SYMPTOMS: ICD-10-CM

## 2023-03-22 DIAGNOSIS — N13.8 BPH WITH OBSTRUCTION/LOWER URINARY TRACT SYMPTOMS: ICD-10-CM

## 2023-03-22 LAB
BILIRUB UR QL: NEGATIVE
GLUCOSE UR-MCNC: 1000 MG/DL
KETONES P FAST UR STRIP-MCNC: NEGATIVE MG/DL
PH UR STRIP: 5 [PH] (ref 4.6–8)
PROT UR QL STRIP: 30
SP GR UR STRIP: 1.01 (ref 1–1.03)
UA UROBILINOGEN AMB POC: NORMAL (ref 0.2–1)
URINALYSIS CLARITY POC: CLEAR
URINALYSIS COLOR POC: YELLOW
URINE BLOOD POC: NORMAL
URINE LEUKOCYTES POC: NEGATIVE
URINE NITRITES POC: NEGATIVE

## 2023-03-22 RX ORDER — POTASSIUM CITRATE 15 MEQ/1
15 TABLET, EXTENDED RELEASE ORAL DAILY
Qty: 90 TABLET | Refills: 3 | Status: SHIPPED | OUTPATIENT
Start: 2023-03-22

## 2023-03-22 RX ORDER — TAMSULOSIN HYDROCHLORIDE 0.4 MG/1
0.4 CAPSULE ORAL DAILY
Qty: 90 CAPSULE | Refills: 3 | Status: SHIPPED | OUTPATIENT
Start: 2023-03-22

## 2023-03-22 RX ORDER — CEPHALEXIN 500 MG/1
500 CAPSULE ORAL DAILY
Qty: 90 CAPSULE | Refills: 3 | Status: SHIPPED | OUTPATIENT
Start: 2023-03-22

## 2023-04-18 ENCOUNTER — OFFICE VISIT (OUTPATIENT)
Dept: ENT CLINIC | Age: 78
End: 2023-04-18

## 2023-04-18 ENCOUNTER — OFFICE VISIT (OUTPATIENT)
Dept: ENT CLINIC | Age: 78
End: 2023-04-18
Payer: COMMERCIAL

## 2023-04-18 DIAGNOSIS — H90.3 SENSORINEURAL HEARING LOSS (SNHL) OF BOTH EARS: Primary | ICD-10-CM

## 2023-04-18 DIAGNOSIS — H90.3 ASYMMETRIC SNHL (SENSORINEURAL HEARING LOSS): Primary | ICD-10-CM

## 2023-04-18 PROCEDURE — 92567 TYMPANOMETRY: CPT | Performed by: AUDIOLOGIST

## 2023-04-18 PROCEDURE — 92557 COMPREHENSIVE HEARING TEST: CPT | Performed by: AUDIOLOGIST

## 2023-04-18 PROCEDURE — 1123F ACP DISCUSS/DSCN MKR DOCD: CPT | Performed by: OTOLARYNGOLOGY

## 2023-04-18 PROCEDURE — 99213 OFFICE O/P EST LOW 20 MIN: CPT | Performed by: OTOLARYNGOLOGY

## 2023-04-18 NOTE — PROGRESS NOTES
Jim Sims, a 78y.o. year old male, was seen in ENT clinic today for a hearing evaluation on referral from Dr. Shayna Jason. Patient complains of hearing loss (R>L). Patient currently has hearing aids purchased through [facility]; however, he reports that he does not use them frequently. Per patient, he last had audiogram (pure tones only) in March of 2021. Prior to this, his last comprehensive audiogram is dated 1; he reports at that time he was told his right ear had \"85% understandability. \" He reports a noticeable difference in his hearing (R>L) and also reports dizziness and imbalance. Otoscopy: normal external ear canals and visible tympanic membranes, bilaterally. *RE: Black object noted in patient's right ear canal; unobstructing; unable to visualize completely. Patient is concerned that this may be a portion of his hearing aid dome which he noticed was missing approximately 6 months ago. LE: normal external ear canal and visible tympanic membrane    Tympanometry: RE Type A, normal  LE Type As, shallow    SRT: RE Speech Reception Threshold (SRT) was obtained at 50 dBHL LE Speech Reception Threshold (SRT) was obtained at 25 dBHL    WRS: RE Poor in quiet when words were presented at 80 dBHL. *30 dB SL per patient MCL  WRS: LE Excellent in quiet when words were presented at 65 dBHL. Pure tone audiometry:  RE: Moderate sloping to severe sensorineural hearing loss  LE: Borderline WNL sloping to moderate-severe sensorineural hearing loss    Sensorineural hearing loss, bilaterally. Asymmetry noted, R>L with disproportionately poor word recognition for the right ear. Impressions:  hearing loss requiring medical/otologic and audiologic follow-up    Plan:  Follow-up with ENT; evaluate asymmetry. Will request records from [facility]). Hearing aid evaluation recommended following medical clearance for asymmetry as patient is interested in new technology.    Repeat audiogram 1 year or sooner if change is noted.     Lindsay Can   Doctor of Audiology

## 2023-04-18 NOTE — PROGRESS NOTES
Otolaryngology-Head and Neck Surgery  Follow Up Patient Visit     Patient: Tracey Clemente  YOB: 1945  MRN: [id number]  Date of Service: 4/18/2023    Chief Complaint:  Hearing loss      Interval hx 4/18/2023  Had audiogram    Interval hx: 10/11/2022    Doing better  Otalgia improved - has only had one or two episodes rather than more constant previously  Itching better as well with ears     History of Present Illness: Tracey Clemente is a 78y.o. year old male who presents today for discussion of bilateral otalgia    Notes bilateral intermittent otalgia for the last several months, after moving here from [place] increased PND as well  Ear discomfort lasts for a few hours and then resolved  Sharp radiating pain    Using antihistamine seems to help    Seen by PCP last month and started flonase - this has also improved ear symptoms, as well as nasal congestion significantly    Chronic hearing loss, with sig noise exposure over the years (prior )   Left ear better hearing ear - some hyperacusis   Had audiogram a few years ago        Past Medical History:  Past Medical History:   Diagnosis Date    Colon polyps     Diabetes ([address]     Hypercholesterolemia     Hypertension     Hypokalemia     Hypothyroidism     Kidney stones     Neuropathy     Prostatitis     Psychiatric disorder     depression/anxiety       Past Surgical History:   Past Surgical History:   Procedure Laterality Date    COLONOSCOPY  11/21/2013    12 polyps    COLONOSCOPY  12/15/2016    no ployps    COLONOSCOPY N/A 3/18/2022    COLONOSCOPY (ANES TIVA) performed by Gogo Garcia MD at [facility] ENDOSCOPY    HX CATARACT REMOVAL       bilateral cataract surgery     HX LITHOTRIPSY      kidney stone removal       Medications:   Current Outpatient Medications   Medication Instructions    aspirin delayed-release 81 mg, Oral, DAILY    azelastine (ASTELIN) 137 mcg (0.1 %) nasal spray INSTILL 1 SPRAY IN EACH NOSTRIL EVERY DAY AS NEEDED FOR RUNNY NOSE    BD Ultra-Fine Short Pen Needle 31 gauge x 5/16\" ndle Use to check glucose 2 times a day    cephALEXin (KEFLEX) 500 mg, DAILY    cephALEXin (KEFLEX) 500 mg, Oral, DAILY    coenzyme Q-10 (CO Q-10) 200 mg, Oral, DAILY    colesevelam (WELCHOL) 625 mg, Oral, 2 TIMES DAILY WITH MEALS    escitalopram oxalate (LEXAPRO) 20 mg tablet TAKE 1 TABLET DAILY    Farxiga 5 mg tab tablet TAKE 1 TABLET DAILY    fluocinolone acetonide oiL (DermOtic Oil) 0.01 % drop 1-2 drops each ear, every few weeks PRN itching    fluticasone propionate (FLONASE) 50 mcg/actuation nasal spray 2 Sprays, Both Nostrils, DAILY    insulin degludec Eliecer Prim FlexTouch U-100) 100 unit/mL (3 mL) inpn INJECT 30 UNITS            SUBCUTANEOUSLY NIGHTLY    losartan (COZAAR) 25 mg, Oral, 2 TIMES DAILY    metFORMIN (GLUCOPHAGE) 500 mg tablet TAKE 1 TABLET BY MOUTH TWICE A DAY WITH MEALS    metoprolol succinate (TOPROL-XL) 25 mg XL tablet No dose, route, or frequency recorded. potassium citrate (Urocit-K 15) 15 mEq TbER tablet 15 mEq, Oral, DAILY    potassium citrate (UROCIT-K) 15 mEq TbER tablet 15 mEq, Oral, DAILY, Take 1 tab  By mouth daily    pregabalin (LYRICA) 200 mg capsule TAKE 1 CAPSULE 3 TIMES A   DAY. MAXIMUM DAILY DOSE:   600MG    rosuvastatin (CRESTOR) 20 mg tablet TAKE 1 TABLET DAILY    SITagliptin phosphate (JANUVIA) 100 mg, Oral, DAILY    synthroid 50 mcg tablet TAKE 1 TABLET DAILY BEFORE BREAKFAST    tamsulosin (FLOMAX) 0.4 mg capsule TAKE 1 CAPSULE BY MOUTH EVERY DAY    tamsulosin (FLOMAX) 0.4 mg, Oral, DAILY    valACYclovir (VALTREX) 500 mg tablet TAKE 1 TABLET BY MOUTH 2 TIMES DAILY AS NEEDED FOR HERPES       Allergies:    Allergies   Allergen Reactions    [place] (Viscum Pini [facility] From [facility]) Hives and Itching       Social History:   Social History     Tobacco Use    Smoking status: Never    Smokeless tobacco: Never   Vaping Use    Vaping Use: Never used   Substance Use Topics    Alcohol use: Not Currently     Alcohol/week: 1.0 standard drink     Types: 1 Cans of beer per week     Comment: ocassional    Drug use: Not Currently        Family History:  Family History   Problem Relation Age of Onset    Hypertension Mother     Heart Disease Mother     Heart Disease Father     Lung Cancer Father     No Known Problems Maternal Aunt     No Known Problems Maternal Uncle     No Known Problems Paternal Aunt     No Known Problems Paternal Uncle     No Known Problems Maternal Grandmother     No Known Problems Maternal Grandfather     No Known Problems Paternal Grandmother     No Known Problems Paternal Grandfather     No Known Problems Other        Review of Systems:    Consitutional: denies fever, excessive weight gain or loss. Eyes: denies diplopia, eye pain. Integumentary: denies new concerning skin lesions. Ears, Nose, Mouth, Throat: denies except as per HPI. Endocrine: denies hot or cold intolerance, increased thirst.  Respiratory: denies cough, hemoptysis, wheezing  Gastrointestinal: denies trouble swallowing, nausea, emesis, regurgitation  Musculoskeletal: denies muscle weakness or wasting  Cardiovascular: denies chest pain, shortness of breath  Neurologic: denies seizures, numbness or tingling, syncope  Hematologic: denies easy bleeding or bruising    Physical Examination:   There were no vitals filed for this visit. General: Comfortable, pleasant, appears stated age  Voice: Strong, speaking in full sentences, no stridor    Face: No masses or lesions, facial strength symmetric   Ears: External ears unremarkable. Bilateral ear canal clear. Tympanic membrane clear and intact, with visible landmarks. Clear middle ear space  Nose: External nose unremarkable. Dorsum midline. Anterior rhinoscopy demonstrates no lesions. Septum midline. Turbinates without hypertrophy. Oral Cavity / Oropharynx: No trismus. Mucosa pink and moist. No lesions. Tongue is midline and mobile. Palate elevates symmetrically.  Uvula midline. Tonsils unremarkable. Base of tongue soft. Floor of mouth soft. Neck: Supple. No adenopathy. Thyroid unremarkable. Palpable laryngeal landmarks. Full neck range of motion   Neurologic: CN II - XI intact. Normal gait        Assessment and Plan:   Asymmetric SNHL  - Reviewed audiogram results  - Hearing asymmetry is chronic, though last audio was many years ago so we do not have records for comparison  - Can see if we can get records from [address] [place] [address] arrange hearing aid evaluation  - Otherwise, he's had prior MRI many years ago as well, 2014?   - Consider MRI if any changes, dizziness, etc, given the asymmetry        The patient was instructed to return to clinic if no improvement or progression of symptoms. Signs to watch out for reviewed.       MD Clifford RamirezUniversity of [facility]  [address] [place] [address] [address]  [facility]  Office Phone: [phone]

## 2023-04-21 RX ORDER — AZELASTINE 1 MG/ML
SPRAY, METERED NASAL
Qty: 1 EACH | Refills: 1 | Status: SHIPPED | OUTPATIENT
Start: 2023-04-21

## 2023-04-24 DIAGNOSIS — E11.40 TYPE 2 DIABETES MELLITUS WITH DIABETIC NEUROPATHY, WITH LONG-TERM CURRENT USE OF INSULIN (HCC): ICD-10-CM

## 2023-04-24 DIAGNOSIS — Z79.4 TYPE 2 DIABETES MELLITUS WITH DIABETIC NEUROPATHY, WITH LONG-TERM CURRENT USE OF INSULIN (HCC): ICD-10-CM

## 2023-04-24 RX ORDER — METFORMIN HYDROCHLORIDE 500 MG/1
500 TABLET ORAL 2 TIMES DAILY WITH MEALS
Qty: 180 TABLET | Refills: 1 | Status: SHIPPED | OUTPATIENT
Start: 2023-04-24

## 2023-05-10 DIAGNOSIS — Z79.4 TYPE 2 DIABETES MELLITUS WITH DIABETIC NEUROPATHY, WITH LONG-TERM CURRENT USE OF INSULIN (HCC): Primary | ICD-10-CM

## 2023-05-10 DIAGNOSIS — E11.40 TYPE 2 DIABETES MELLITUS WITH DIABETIC NEUROPATHY, WITH LONG-TERM CURRENT USE OF INSULIN (HCC): Primary | ICD-10-CM

## 2023-05-10 RX ORDER — ESCITALOPRAM OXALATE 20 MG/1
TABLET ORAL
Qty: 90 TABLET | Refills: 1 | Status: SHIPPED | OUTPATIENT
Start: 2023-05-10

## 2023-05-10 RX ORDER — PREGABALIN 200 MG/1
CAPSULE ORAL
Qty: 270 CAPSULE | Refills: 0 | Status: SHIPPED | OUTPATIENT
Start: 2023-05-10 | End: 2023-08-08

## 2023-05-10 RX ORDER — LEVOTHYROXINE SODIUM 50 MCG
TABLET ORAL
Qty: 90 TABLET | Refills: 1 | Status: SHIPPED | OUTPATIENT
Start: 2023-05-10

## 2023-05-10 RX ORDER — ROSUVASTATIN CALCIUM 20 MG/1
TABLET, COATED ORAL
Qty: 90 TABLET | Refills: 1 | Status: SHIPPED | OUTPATIENT
Start: 2023-05-10

## 2023-05-19 ENCOUNTER — PROCEDURE VISIT (OUTPATIENT)
Age: 78
End: 2023-05-19

## 2023-05-19 DIAGNOSIS — H90.3 SENSORINEURAL HEARING LOSS, BILATERAL: Primary | ICD-10-CM

## 2023-05-19 NOTE — PROGRESS NOTES
Pt. Name: Curt Rondon   : 1945  MRN: [id number]    Appointment type: Hearing Aid Evaluation  Patient is a very pleasant 78y.o. year old male referred by Dr. Chandra Ward for a hearing aid evaluation. Patient's last hearing test was 2023 which shows a moderate to severe sensorineural hearing loss in the right ear and a borderline normal to moderate-severe sensorineural hearing loss in the left ear. Patient reports difficulty understanding speech, especially in the presence of background noise. Reviewed audiogram with patient and discussed hearing aid candidacy. Went over hearing aid style, technology and cost. Recommended trial with hearing aids. Patient agreed. Will order OtUpclique Real 2-R hearing aids for patient. Hearing Aid Bob Scrape form can be found under Media. Plan: Patient to RTC for HAF.      Wilfredo Klein  Doctor of Audiology

## 2023-05-22 ENCOUNTER — TELEPHONE (OUTPATIENT)
Facility: CLINIC | Age: 78
End: 2023-05-22

## 2023-05-22 DIAGNOSIS — E11.40 TYPE 2 DIABETES MELLITUS WITH DIABETIC NEUROPATHY, WITH LONG-TERM CURRENT USE OF INSULIN (HCC): Primary | ICD-10-CM

## 2023-05-22 DIAGNOSIS — Z79.4 TYPE 2 DIABETES MELLITUS WITH DIABETIC NEUROPATHY, WITH LONG-TERM CURRENT USE OF INSULIN (HCC): Primary | ICD-10-CM

## 2023-05-22 DIAGNOSIS — Z79.4 TYPE 2 DIABETES MELLITUS WITH DIABETIC NEUROPATHY, WITH LONG-TERM CURRENT USE OF INSULIN (HCC): ICD-10-CM

## 2023-05-22 DIAGNOSIS — E11.40 TYPE 2 DIABETES MELLITUS WITH DIABETIC NEUROPATHY, WITH LONG-TERM CURRENT USE OF INSULIN (HCC): ICD-10-CM

## 2023-05-22 RX ORDER — INSULIN GLARGINE 100 [IU]/ML
INJECTION, SOLUTION SUBCUTANEOUS NIGHTLY
COMMUNITY
Start: 2021-11-10

## 2023-05-22 RX ORDER — PREGABALIN 200 MG/1
CAPSULE ORAL
Qty: 270 CAPSULE | Refills: 0 | Status: SHIPPED | OUTPATIENT
Start: 2023-05-22 | End: 2023-08-22

## 2023-05-22 RX ORDER — ICOSAPENT ETHYL 1000 MG/1
1 CAPSULE ORAL 2 TIMES DAILY
COMMUNITY

## 2023-05-22 NOTE — TELEPHONE ENCOUNTER
Patient following up on refill request for Pregabalin. Stated that he received letter from insurance that provider refused.

## 2023-05-23 RX ORDER — AZELASTINE HYDROCHLORIDE 137 UG/1
SPRAY, METERED NASAL
Qty: 1 EACH | Refills: 1 | Status: SHIPPED | OUTPATIENT
Start: 2023-05-23

## 2023-06-19 RX ORDER — AZELASTINE HYDROCHLORIDE 137 UG/1
SPRAY, METERED NASAL
Qty: 1 EACH | Refills: 1 | Status: SHIPPED | OUTPATIENT
Start: 2023-06-19

## 2023-06-26 ENCOUNTER — PROCEDURE VISIT (OUTPATIENT)
Age: 78
End: 2023-06-26
Payer: COMMERCIAL

## 2023-06-26 DIAGNOSIS — H90.3 SENSORINEURAL HEARING LOSS, BILATERAL: Primary | ICD-10-CM

## 2023-06-26 PROCEDURE — V5299 HEARING SERVICE: HCPCS | Performed by: AUDIOLOGIST

## 2023-07-11 ENCOUNTER — PROCEDURE VISIT (OUTPATIENT)
Age: 78
End: 2023-07-11

## 2023-07-11 DIAGNOSIS — H90.3 SENSORINEURAL HEARING LOSS, BILATERAL: Primary | ICD-10-CM

## 2023-07-11 NOTE — PROGRESS NOTES
Pt. Name: Tiffanie Cortez   : 1945   MRN: [id number]     Appointment type: Hearing Aid Check (End of Trial)    : Connie Rios   Model: Real 2-R  S/N R: [id number]   S/N L: [id number]  Repair warranty: 2026  L/D warranty: 2026    Patient in for a progress check with Oticon Real 2-R hearing aids. Patient reports he is pleased with the hearing aids and the programming changes made at the last appointment. Data logging revealed average daily wear time of 3 hours 10 minutes. Discussed that while this is improved from previous appointment (2 hours 13 minutes) it is important to continue consistent use of hearing aids. No other programming changes made today. Did provide patient with several extra 10mm double bass domes and 2 packs of wax traps. Patient would like to end his hearing aid trial and keep his hearing aids. Offered appointments in 1, 3, or 6 months; patient would prefer 6 months. He is aware that he can make an interim appointment if any issues arise. Plan: Pt. to RTC in 6 months for hearing aid check.      Wilfredo Cedeno   Doctor of Audiology

## 2023-07-31 DIAGNOSIS — E11.40 TYPE 2 DIABETES MELLITUS WITH DIABETIC NEUROPATHY, WITH LONG-TERM CURRENT USE OF INSULIN (HCC): ICD-10-CM

## 2023-07-31 DIAGNOSIS — Z79.4 TYPE 2 DIABETES MELLITUS WITH DIABETIC NEUROPATHY, WITH LONG-TERM CURRENT USE OF INSULIN (HCC): ICD-10-CM

## 2023-07-31 RX ORDER — DAPAGLIFLOZIN 5 MG/1
TABLET, FILM COATED ORAL
Qty: 90 TABLET | Refills: 1 | OUTPATIENT
Start: 2023-07-31

## 2023-07-31 RX ORDER — SITAGLIPTIN 100 MG/1
TABLET, FILM COATED ORAL
Qty: 90 TABLET | Refills: 0 | OUTPATIENT
Start: 2023-07-31

## 2023-08-01 ENCOUNTER — OFFICE VISIT (OUTPATIENT)
Facility: CLINIC | Age: 78
End: 2023-08-01
Payer: COMMERCIAL

## 2023-08-01 VITALS
WEIGHT: 260.4 LBS | TEMPERATURE: 97.8 F | HEART RATE: 77 BPM | RESPIRATION RATE: 18 BRPM | SYSTOLIC BLOOD PRESSURE: 117 MMHG | HEIGHT: 69 IN | DIASTOLIC BLOOD PRESSURE: 64 MMHG | OXYGEN SATURATION: 98 % | BODY MASS INDEX: 38.57 KG/M2

## 2023-08-01 DIAGNOSIS — E11.65 UNCONTROLLED TYPE 2 DIABETES MELLITUS WITH HYPERGLYCEMIA (HCC): Primary | ICD-10-CM

## 2023-08-01 PROCEDURE — 3078F DIAST BP <80 MM HG: CPT | Performed by: NURSE PRACTITIONER

## 2023-08-01 PROCEDURE — 3074F SYST BP LT 130 MM HG: CPT | Performed by: NURSE PRACTITIONER

## 2023-08-01 PROCEDURE — 1123F ACP DISCUSS/DSCN MKR DOCD: CPT | Performed by: NURSE PRACTITIONER

## 2023-08-01 PROCEDURE — 99214 OFFICE O/P EST MOD 30 MIN: CPT | Performed by: NURSE PRACTITIONER

## 2023-08-01 RX ORDER — CEPHALEXIN 500 MG/1
500 CAPSULE ORAL DAILY
COMMUNITY

## 2023-08-01 RX ORDER — PEN NEEDLE, DIABETIC 31 GX5/16"
NEEDLE, DISPOSABLE MISCELLANEOUS
COMMUNITY
Start: 2023-05-22

## 2023-08-01 SDOH — ECONOMIC STABILITY: FOOD INSECURITY: WITHIN THE PAST 12 MONTHS, YOU WORRIED THAT YOUR FOOD WOULD RUN OUT BEFORE YOU GOT MONEY TO BUY MORE.: NEVER TRUE

## 2023-08-01 SDOH — ECONOMIC STABILITY: TRANSPORTATION INSECURITY
IN THE PAST 12 MONTHS, HAS THE LACK OF TRANSPORTATION KEPT YOU FROM MEDICAL APPOINTMENTS OR FROM GETTING MEDICATIONS?: NO

## 2023-08-01 SDOH — ECONOMIC STABILITY: HOUSING INSECURITY
IN THE LAST 12 MONTHS, WAS THERE A TIME WHEN YOU DID NOT HAVE A STEADY PLACE TO SLEEP OR SLEPT IN A SHELTER (INCLUDING NOW)?: NO

## 2023-08-01 SDOH — ECONOMIC STABILITY: FOOD INSECURITY: WITHIN THE PAST 12 MONTHS, THE FOOD YOU BOUGHT JUST DIDN'T LAST AND YOU DIDN'T HAVE MONEY TO GET MORE.: NEVER TRUE

## 2023-08-01 SDOH — ECONOMIC STABILITY: INCOME INSECURITY: IN THE LAST 12 MONTHS, WAS THERE A TIME WHEN YOU WERE NOT ABLE TO PAY THE MORTGAGE OR RENT ON TIME?: NO

## 2023-08-01 SDOH — ECONOMIC STABILITY: TRANSPORTATION INSECURITY
IN THE PAST 12 MONTHS, HAS LACK OF TRANSPORTATION KEPT YOU FROM MEETINGS, WORK, OR FROM GETTING THINGS NEEDED FOR DAILY LIVING?: NO

## 2023-08-01 ASSESSMENT — SOCIAL DETERMINANTS OF HEALTH (SDOH): HOW HARD IS IT FOR YOU TO PAY FOR THE VERY BASICS LIKE FOOD, HOUSING, MEDICAL CARE, AND HEATING?: NOT HARD AT ALL

## 2023-08-01 NOTE — PROGRESS NOTES
Chief Complaint   Patient presents with    Diabetes    Medication Adjustment     Wants to talk about his DM medication    Pt brought in meds went over list in chart, pt confirmed      No other c/o    1. Have you been to the ER, urgent care clinic since your last visit? Hospitalized since your last visit? No    2. Have you seen or consulted any other health care providers outside of the [address] [place] [address] Avenue since your last visit? Include any pap smears or colon screening.  No

## 2023-08-01 NOTE — PROGRESS NOTES
Doreen Sahni is a 78 y.o. male who presents to the office today for the following:    Chief Complaint   Patient presents with    Diabetes    Medication Adjustment        Past Medical History:   Diagnosis Date    Colon polyps     Diabetes ([address])     Hypercholesterolemia     Hypertension     Hypokalemia     Hypothyroidism     Kidney stones     Neuropathy     Prostatitis     Psychiatric disorder     depression/anxiety       Past Surgical History:   Procedure Laterality Date    COLONOSCOPY  11/21/2013    12 polyps    COLONOSCOPY  12/15/2016    no ployps    COLONOSCOPY N/A 3/18/2022    COLONOSCOPY (ANES TIVA) performed by Christina Ruth MD at [facility] ENDOSCOPY    HX CATARACT REMOVAL       bilateral cataract surgery     HX LITHOTRIPSY      kidney stone removal        Family History   Problem Relation Age of Onset    Hypertension Mother     Heart Disease Mother     Heart Disease Father     Lung Cancer Father     No Known Problems Maternal Aunt     No Known Problems Maternal Uncle     No Known Problems Paternal Aunt     No Known Problems Paternal Uncle     No Known Problems Maternal Grandmother     No Known Problems Maternal Grandfather     No Known Problems Paternal Grandmother     No Known Problems Paternal Grandfather     No Known Problems Other         Social History     Tobacco Use    Smoking status: Never    Smokeless tobacco: Never   Vaping Use    Vaping Use: Never used   Substance Use Topics    Alcohol use: Not Currently     Alcohol/week: 1.0 standard drink     Types: 1 Cans of beer per week     Comment: ocassional    Drug use: Not Currently      HPI  Patient here today for 4 week follow up of diabetes and new medication with [facility] of type 2 diabetes, hyperlipidemia, diastolic dysfunction, BPH, neuropathy, hypothyroidism, hearing loss, herpes,anxiety/depression, ETD,colon polyps and obesity. Reports that he just received mounjaro via mail and has not taken dose yet.  Has been starting to work on dietary changes

## 2023-08-13 PROBLEM — F33.1 MAJOR DEPRESSIVE DISORDER, RECURRENT, MODERATE (HCC): Status: ACTIVE | Noted: 2023-08-13

## 2023-08-13 PROBLEM — F33.9 MAJOR DEPRESSIVE DISORDER, RECURRENT, UNSPECIFIED (HCC): Status: ACTIVE | Noted: 2023-08-13

## 2023-08-13 PROBLEM — F33.0 MAJOR DEPRESSIVE DISORDER, RECURRENT, MILD (HCC): Status: ACTIVE | Noted: 2023-08-13

## 2023-08-20 DIAGNOSIS — B00.9 HERPESVIRAL INFECTION, UNSPECIFIED: ICD-10-CM

## 2023-08-20 RX ORDER — VALACYCLOVIR HYDROCHLORIDE 500 MG/1
TABLET, FILM COATED ORAL
Qty: 180 TABLET | Refills: 1 | Status: SHIPPED | OUTPATIENT
Start: 2023-08-20

## 2023-08-31 DIAGNOSIS — Z79.4 TYPE 2 DIABETES MELLITUS WITH DIABETIC NEUROPATHY, WITH LONG-TERM CURRENT USE OF INSULIN (HCC): ICD-10-CM

## 2023-08-31 DIAGNOSIS — E11.40 TYPE 2 DIABETES MELLITUS WITH DIABETIC NEUROPATHY, WITH LONG-TERM CURRENT USE OF INSULIN (HCC): ICD-10-CM

## 2023-09-01 RX ORDER — PREGABALIN 200 MG/1
CAPSULE ORAL
Qty: 270 CAPSULE | Refills: 0 | Status: SHIPPED | OUTPATIENT
Start: 2023-09-01 | End: 2023-12-01

## 2023-09-25 RX ORDER — DAPAGLIFLOZIN 5 MG/1
5 TABLET, FILM COATED ORAL DAILY
Qty: 90 TABLET | Refills: 1 | Status: SHIPPED | OUTPATIENT
Start: 2023-09-25

## 2023-09-29 ENCOUNTER — OFFICE VISIT (OUTPATIENT)
Facility: CLINIC | Age: 78
End: 2023-09-29
Payer: COMMERCIAL

## 2023-09-29 VITALS
WEIGHT: 251.13 LBS | BODY MASS INDEX: 37.2 KG/M2 | HEART RATE: 68 BPM | TEMPERATURE: 97.6 F | SYSTOLIC BLOOD PRESSURE: 139 MMHG | RESPIRATION RATE: 20 BRPM | HEIGHT: 69 IN | DIASTOLIC BLOOD PRESSURE: 75 MMHG | OXYGEN SATURATION: 98 %

## 2023-09-29 DIAGNOSIS — E78.2 MIXED HYPERLIPIDEMIA: ICD-10-CM

## 2023-09-29 DIAGNOSIS — E66.01 MORBID (SEVERE) OBESITY DUE TO EXCESS CALORIES (HCC): ICD-10-CM

## 2023-09-29 DIAGNOSIS — Z79.4 TYPE 2 DIABETES MELLITUS WITH DIABETIC NEUROPATHY, WITH LONG-TERM CURRENT USE OF INSULIN (HCC): Primary | ICD-10-CM

## 2023-09-29 DIAGNOSIS — Z86.010 HISTORY OF COLON POLYPS: ICD-10-CM

## 2023-09-29 DIAGNOSIS — N40.0 BENIGN PROSTATIC HYPERPLASIA WITHOUT LOWER URINARY TRACT SYMPTOMS: ICD-10-CM

## 2023-09-29 DIAGNOSIS — F32.A ANXIETY AND DEPRESSION: ICD-10-CM

## 2023-09-29 DIAGNOSIS — G62.9 POLYNEUROPATHY, UNSPECIFIED: ICD-10-CM

## 2023-09-29 DIAGNOSIS — H90.3 SENSORINEURAL HEARING LOSS (SNHL) OF BOTH EARS: ICD-10-CM

## 2023-09-29 DIAGNOSIS — H69.93 DYSFUNCTION OF BOTH EUSTACHIAN TUBES: ICD-10-CM

## 2023-09-29 DIAGNOSIS — B00.9 HERPES: ICD-10-CM

## 2023-09-29 DIAGNOSIS — E11.40 TYPE 2 DIABETES MELLITUS WITH DIABETIC NEUROPATHY, WITH LONG-TERM CURRENT USE OF INSULIN (HCC): Primary | ICD-10-CM

## 2023-09-29 DIAGNOSIS — Z23 FLU VACCINE NEED: ICD-10-CM

## 2023-09-29 DIAGNOSIS — I51.89 DIASTOLIC DYSFUNCTION: ICD-10-CM

## 2023-09-29 DIAGNOSIS — E03.9 ACQUIRED HYPOTHYROIDISM: ICD-10-CM

## 2023-09-29 DIAGNOSIS — F41.9 ANXIETY AND DEPRESSION: ICD-10-CM

## 2023-09-29 PROBLEM — F33.1 MAJOR DEPRESSIVE DISORDER, RECURRENT, MODERATE (HCC): Status: RESOLVED | Noted: 2023-08-13 | Resolved: 2023-09-29

## 2023-09-29 PROBLEM — F33.0 MAJOR DEPRESSIVE DISORDER, RECURRENT, MILD (HCC): Status: RESOLVED | Noted: 2023-08-13 | Resolved: 2023-09-29

## 2023-09-29 PROBLEM — F33.9 MAJOR DEPRESSIVE DISORDER, RECURRENT, UNSPECIFIED (HCC): Status: RESOLVED | Noted: 2023-08-13 | Resolved: 2023-09-29

## 2023-09-29 LAB — HBA1C MFR BLD: 7.8 %

## 2023-09-29 PROCEDURE — 83036 HEMOGLOBIN GLYCOSYLATED A1C: CPT | Performed by: NURSE PRACTITIONER

## 2023-09-29 PROCEDURE — 3078F DIAST BP <80 MM HG: CPT | Performed by: NURSE PRACTITIONER

## 2023-09-29 PROCEDURE — 90694 VACC AIIV4 NO PRSRV 0.5ML IM: CPT | Performed by: NURSE PRACTITIONER

## 2023-09-29 PROCEDURE — 3075F SYST BP GE 130 - 139MM HG: CPT | Performed by: NURSE PRACTITIONER

## 2023-09-29 PROCEDURE — 90471 IMMUNIZATION ADMIN: CPT | Performed by: NURSE PRACTITIONER

## 2023-09-29 PROCEDURE — 1123F ACP DISCUSS/DSCN MKR DOCD: CPT | Performed by: NURSE PRACTITIONER

## 2023-09-29 PROCEDURE — 99214 OFFICE O/P EST MOD 30 MIN: CPT | Performed by: NURSE PRACTITIONER

## 2023-09-29 RX ORDER — TIRZEPATIDE 5 MG/.5ML
5 INJECTION, SOLUTION SUBCUTANEOUS WEEKLY
Qty: 9 ML | Refills: 1 | Status: SHIPPED | OUTPATIENT
Start: 2023-09-29

## 2023-09-29 NOTE — PROGRESS NOTES
Chief Complaint   Patient presents with    Diabetes     States was started on new medication 3 months ago and is here for F/U. States is doing well. Taking Monjaro. 1. Have you been to the ER, urgent care clinic since your last visit? Hospitalized since your last visit? No    2. Have you seen or consulted any other health care providers outside of the [address] [place] [address] since your last visit? Include any pap smears or colon screening.  No

## 2023-11-05 DIAGNOSIS — E11.40 TYPE 2 DIABETES MELLITUS WITH DIABETIC NEUROPATHY, UNSPECIFIED (HCC): ICD-10-CM

## 2023-11-27 RX ORDER — INSULIN DEGLUDEC INJECTION 100 U/ML
INJECTION, SOLUTION SUBCUTANEOUS
Qty: 30 ML | Refills: 3 | Status: SHIPPED | OUTPATIENT
Start: 2023-11-27

## 2023-11-27 RX ORDER — ESCITALOPRAM OXALATE 20 MG/1
TABLET ORAL
Qty: 90 TABLET | Refills: 1 | Status: SHIPPED | OUTPATIENT
Start: 2023-11-27

## 2023-11-27 RX ORDER — LEVOTHYROXINE SODIUM 50 MCG
TABLET ORAL
Qty: 90 TABLET | Refills: 1 | Status: SHIPPED | OUTPATIENT
Start: 2023-11-27

## 2023-11-27 RX ORDER — ROSUVASTATIN CALCIUM 20 MG/1
TABLET, COATED ORAL
Qty: 90 TABLET | Refills: 1 | Status: SHIPPED | OUTPATIENT
Start: 2023-11-27

## 2023-11-29 DIAGNOSIS — J30.2 SEASONAL ALLERGIES: Primary | ICD-10-CM

## 2023-11-29 RX ORDER — FLUTICASONE PROPIONATE 50 MCG
2 SPRAY, SUSPENSION (ML) NASAL DAILY
Qty: 16 G | Refills: 3 | Status: SHIPPED | OUTPATIENT
Start: 2023-11-29

## 2024-01-02 DIAGNOSIS — Z79.4 TYPE 2 DIABETES MELLITUS WITH DIABETIC NEUROPATHY, WITH LONG-TERM CURRENT USE OF INSULIN (HCC): Primary | ICD-10-CM

## 2024-01-02 DIAGNOSIS — E11.40 TYPE 2 DIABETES MELLITUS WITH DIABETIC NEUROPATHY, WITH LONG-TERM CURRENT USE OF INSULIN (HCC): Primary | ICD-10-CM

## 2024-01-02 RX ORDER — COLESEVELAM 180 1/1
1875 TABLET ORAL 2 TIMES DAILY WITH MEALS
Qty: 540 TABLET | Refills: 0 | Status: SHIPPED | OUTPATIENT
Start: 2024-01-02 | End: 2024-04-01

## 2024-01-04 ENCOUNTER — OFFICE VISIT (OUTPATIENT)
Facility: CLINIC | Age: 79
End: 2024-01-04
Payer: COMMERCIAL

## 2024-01-04 VITALS
DIASTOLIC BLOOD PRESSURE: 71 MMHG | SYSTOLIC BLOOD PRESSURE: 145 MMHG | WEIGHT: 245.2 LBS | HEIGHT: 68 IN | HEART RATE: 75 BPM | RESPIRATION RATE: 18 BRPM | BODY MASS INDEX: 37.16 KG/M2 | TEMPERATURE: 97.4 F | OXYGEN SATURATION: 98 %

## 2024-01-04 DIAGNOSIS — B00.9 HERPES: ICD-10-CM

## 2024-01-04 DIAGNOSIS — Z79.4 TYPE 2 DIABETES MELLITUS WITH DIABETIC NEUROPATHY, WITH LONG-TERM CURRENT USE OF INSULIN (HCC): Primary | ICD-10-CM

## 2024-01-04 DIAGNOSIS — N40.0 BENIGN PROSTATIC HYPERPLASIA WITHOUT LOWER URINARY TRACT SYMPTOMS: ICD-10-CM

## 2024-01-04 DIAGNOSIS — Z86.010 HISTORY OF COLON POLYPS: ICD-10-CM

## 2024-01-04 DIAGNOSIS — G62.9 POLYNEUROPATHY, UNSPECIFIED: ICD-10-CM

## 2024-01-04 DIAGNOSIS — H69.93 DYSFUNCTION OF BOTH EUSTACHIAN TUBES: ICD-10-CM

## 2024-01-04 DIAGNOSIS — F32.A ANXIETY AND DEPRESSION: ICD-10-CM

## 2024-01-04 DIAGNOSIS — M54.31 RIGHT SCIATIC NERVE PAIN: ICD-10-CM

## 2024-01-04 DIAGNOSIS — Z23 IMMUNIZATION DUE: ICD-10-CM

## 2024-01-04 DIAGNOSIS — E66.01 SEVERE OBESITY (BMI 35.0-35.9 WITH COMORBIDITY) (HCC): ICD-10-CM

## 2024-01-04 DIAGNOSIS — H90.3 SENSORINEURAL HEARING LOSS (SNHL) OF BOTH EARS: ICD-10-CM

## 2024-01-04 DIAGNOSIS — I51.89 DIASTOLIC DYSFUNCTION: ICD-10-CM

## 2024-01-04 DIAGNOSIS — E11.40 TYPE 2 DIABETES MELLITUS WITH DIABETIC NEUROPATHY, WITH LONG-TERM CURRENT USE OF INSULIN (HCC): Primary | ICD-10-CM

## 2024-01-04 DIAGNOSIS — F41.9 ANXIETY AND DEPRESSION: ICD-10-CM

## 2024-01-04 DIAGNOSIS — E03.9 ACQUIRED HYPOTHYROIDISM: ICD-10-CM

## 2024-01-04 DIAGNOSIS — E55.9 VITAMIN D DEFICIENCY: ICD-10-CM

## 2024-01-04 DIAGNOSIS — E78.2 MIXED HYPERLIPIDEMIA: ICD-10-CM

## 2024-01-04 PROBLEM — F33.9 MAJOR DEPRESSIVE DISORDER, RECURRENT, UNSPECIFIED (HCC): Status: ACTIVE | Noted: 2024-01-04

## 2024-01-04 PROBLEM — F33.1 MAJOR DEPRESSIVE DISORDER, RECURRENT, MODERATE (HCC): Status: ACTIVE | Noted: 2024-01-04

## 2024-01-04 PROBLEM — F33.0 MAJOR DEPRESSIVE DISORDER, RECURRENT, MILD (HCC): Status: ACTIVE | Noted: 2024-01-04

## 2024-01-04 LAB — HBA1C MFR BLD: 7 %

## 2024-01-04 PROCEDURE — 90471 IMMUNIZATION ADMIN: CPT | Performed by: NURSE PRACTITIONER

## 2024-01-04 PROCEDURE — 3077F SYST BP >= 140 MM HG: CPT | Performed by: NURSE PRACTITIONER

## 2024-01-04 PROCEDURE — 99214 OFFICE O/P EST MOD 30 MIN: CPT | Performed by: NURSE PRACTITIONER

## 2024-01-04 PROCEDURE — 90715 TDAP VACCINE 7 YRS/> IM: CPT | Performed by: NURSE PRACTITIONER

## 2024-01-04 PROCEDURE — 83036 HEMOGLOBIN GLYCOSYLATED A1C: CPT | Performed by: NURSE PRACTITIONER

## 2024-01-04 PROCEDURE — 3078F DIAST BP <80 MM HG: CPT | Performed by: NURSE PRACTITIONER

## 2024-01-04 PROCEDURE — 1123F ACP DISCUSS/DSCN MKR DOCD: CPT | Performed by: NURSE PRACTITIONER

## 2024-01-04 NOTE — PROGRESS NOTES
Ugo Dueñas is a 77 y.o. male who presents to the office today for the following:    Chief Complaint   Patient presents with    Diabetes        Past Medical History:   Diagnosis Date    Colon polyps     Diabetes (HCC)     Hypercholesterolemia     Hypertension     Hypokalemia     Hypothyroidism     Kidney stones     Neuropathy     Prostatitis     Psychiatric disorder     depression/anxiety       Past Surgical History:   Procedure Laterality Date    COLONOSCOPY  11/21/2013    12 polyps    COLONOSCOPY  12/15/2016    no ployps    COLONOSCOPY N/A 3/18/2022    COLONOSCOPY (RUPERTO TIVA) performed by Rajendra Knapp MD at [facility] ENDOSCOPY    HX CATARACT REMOVAL       bilateral cataract surgery     HX LITHOTRIPSY      kidney stone removal        Family History   Problem Relation Age of Onset    Hypertension Mother     Heart Disease Mother     Heart Disease Father     Lung Cancer Father     No Known Problems Maternal Aunt     No Known Problems Maternal Uncle     No Known Problems Paternal Aunt     No Known Problems Paternal Uncle     No Known Problems Maternal Grandmother     No Known Problems Maternal Grandfather     No Known Problems Paternal Grandmother     No Known Problems Paternal Grandfather     No Known Problems Other         Social History     Tobacco Use    Smoking status: Never    Smokeless tobacco: Never   Vaping Use    Vaping Use: Never used   Substance Use Topics    Alcohol use: Not Currently     Alcohol/week: 1.0 standard drink     Types: 1 Cans of beer per week     Comment: ocassional    Drug use: Not Currently      HPI  Patient here today for follow up of chronic conditions with PMH of type 2 diabetes, hyperlipidemia, diastolic dysfunction, BPH, neuropathy, hypothyroidism, hearing loss, herpes,anxiety/depression, ETD,colon polyps and obesity. Reports taking medications as noted in chart but has not taken yet this am. Has seen cardiology, urology and ENT.  Reports no new concerns today. Does want to

## 2024-01-04 NOTE — PROGRESS NOTES
Chief Complaint   Patient presents with    Diabetes     Follow up     Pt did not take his meds today     No other c/o    1. Have you been to the ER, urgent care clinic since your last visit?  Hospitalized since your last visit?No    2. Have you seen or consulted any other health care providers outside of the [facility] since your last visit?  Include any pap smears or colon screening. No

## 2024-01-05 LAB
25(OH)D3+25(OH)D2 SERPL-MCNC: 13.1 NG/ML (ref 30–100)
ALBUMIN SERPL-MCNC: 4.6 G/DL (ref 3.8–4.8)
ALBUMIN/CREAT UR: 83 MG/G CREAT (ref 0–29)
ALBUMIN/GLOB SERPL: 2.2 {RATIO} (ref 1.2–2.2)
ALP SERPL-CCNC: 60 IU/L (ref 44–121)
ALT SERPL-CCNC: 24 IU/L (ref 0–44)
AST SERPL-CCNC: 22 IU/L (ref 0–40)
BASOPHILS # BLD AUTO: 0.1 X10E3/UL (ref 0–0.2)
BASOPHILS NFR BLD AUTO: 1 %
BILIRUB SERPL-MCNC: 0.7 MG/DL (ref 0–1.2)
BUN SERPL-MCNC: 15 MG/DL (ref 8–27)
BUN/CREAT SERPL: 15 (ref 10–24)
CALCIUM SERPL-MCNC: 9.4 MG/DL (ref 8.6–10.2)
CHLORIDE SERPL-SCNC: 100 MMOL/L (ref 96–106)
CHOLEST SERPL-MCNC: 99 MG/DL (ref 100–199)
CO2 SERPL-SCNC: 19 MMOL/L (ref 20–29)
CREAT SERPL-MCNC: 1.02 MG/DL (ref 0.76–1.27)
CREAT UR-MCNC: 69.3 MG/DL
EGFRCR SERPLBLD CKD-EPI 2021: 76 ML/MIN/1.73
EOSINOPHIL # BLD AUTO: 0.3 X10E3/UL (ref 0–0.4)
EOSINOPHIL NFR BLD AUTO: 3 %
ERYTHROCYTE [DISTWIDTH] IN BLOOD BY AUTOMATED COUNT: 13.6 % (ref 11.6–15.4)
FOLATE SERPL-MCNC: 12.8 NG/ML
GLOBULIN SER CALC-MCNC: 2.1 G/DL (ref 1.5–4.5)
GLUCOSE SERPL-MCNC: 125 MG/DL (ref 70–99)
HCT VFR BLD AUTO: 50 % (ref 37.5–51)
HDLC SERPL-MCNC: 33 MG/DL
HGB BLD-MCNC: 16.4 G/DL (ref 13–17.7)
IMM GRANULOCYTES # BLD AUTO: 0.1 X10E3/UL (ref 0–0.1)
IMM GRANULOCYTES NFR BLD AUTO: 1 %
LDLC SERPL CALC-MCNC: 16 MG/DL (ref 0–99)
LYMPHOCYTES # BLD AUTO: 2.2 X10E3/UL (ref 0.7–3.1)
LYMPHOCYTES NFR BLD AUTO: 27 %
MCH RBC QN AUTO: 29.1 PG (ref 26.6–33)
MCHC RBC AUTO-ENTMCNC: 32.8 G/DL (ref 31.5–35.7)
MCV RBC AUTO: 89 FL (ref 79–97)
MICROALBUMIN UR-MCNC: 57.7 UG/ML
MONOCYTES # BLD AUTO: 0.5 X10E3/UL (ref 0.1–0.9)
MONOCYTES NFR BLD AUTO: 6 %
NEUTROPHILS # BLD AUTO: 5.1 X10E3/UL (ref 1.4–7)
NEUTROPHILS NFR BLD AUTO: 62 %
PLATELET # BLD AUTO: 162 X10E3/UL (ref 150–450)
POTASSIUM SERPL-SCNC: 5.2 MMOL/L (ref 3.5–5.2)
PROT SERPL-MCNC: 6.7 G/DL (ref 6–8.5)
RBC # BLD AUTO: 5.64 X10E6/UL (ref 4.14–5.8)
SODIUM SERPL-SCNC: 139 MMOL/L (ref 134–144)
TRIGL SERPL-MCNC: 358 MG/DL (ref 0–149)
TSH SERPL DL<=0.005 MIU/L-ACNC: 2.49 UIU/ML (ref 0.45–4.5)
VIT B12 SERPL-MCNC: 381 PG/ML (ref 232–1245)
VLDLC SERPL CALC-MCNC: 50 MG/DL (ref 5–40)
WBC # BLD AUTO: 8.1 X10E3/UL (ref 3.4–10.8)

## 2024-01-07 RX ORDER — ERGOCALCIFEROL 1.25 MG/1
1 CAPSULE ORAL
Qty: 8 CAPSULE | Refills: 0 | Status: SHIPPED | OUTPATIENT
Start: 2024-01-07

## 2024-01-11 ENCOUNTER — PROCEDURE VISIT (OUTPATIENT)
Age: 79
End: 2024-01-11

## 2024-01-11 DIAGNOSIS — H90.3 SENSORINEURAL HEARING LOSS, BILATERAL: Primary | ICD-10-CM

## 2024-01-11 PROCEDURE — NBSRV NON-BILLABLE SERVICE: Performed by: AUDIOLOGIST

## 2024-01-11 NOTE — PROGRESS NOTES
Ugo Dueñas  1945.78 y.o. male   [id number]    :  Oticon                          Model: Real 2-R  S/N R:  [id number]                                   S/N L: [id number]  Repair warranty: 6-                L/D warranty: 6-       HEARING AID CHECK    Ugo Dueñas was seen today, 1/11/2024, for a hearing aid check appointment    PROCEDURES:     Otoscopy revealed: Clear ear canals bilaterally    Patient admits does not wear as much as he should; however, when he does reports benefit..  Hearing aids were cleaned and checked.  A listening check revealed good function of the devices.  Microphone and  ports were suctioned, domes and wax traps were changed. Post-cleaning listening check revealed good function of the devices.       Connected devices to fitting software. Updated firmware.     PATIENT EDUCATION:     - Verbally and visually reviewed importance of consistent use and care and maintenance of devices.      Information was verbally shared with patient during appointment.        RECOMMENDATIONS:     Continue consistent hearing aid use  Return for hearing aid checks as needed  Retest hearing as medically indicated and/or sooner if a change in hearing is noted.  Contact audiologist with questions/concerns as needed  -Return for hearing aid checks as needed. Unable to schedule at this time as no permanent provider has been hired. Recommended patient call beginning 2024.     **No charge for today's appointment    Wilfredo Pearce  Audiologist

## 2024-01-12 VITALS — DIASTOLIC BLOOD PRESSURE: 71 MMHG | SYSTOLIC BLOOD PRESSURE: 124 MMHG

## 2024-02-02 RX ORDER — CEPHALEXIN 500 MG/1
CAPSULE ORAL DAILY
Qty: 90 CAPSULE | Refills: 3 | OUTPATIENT
Start: 2024-02-02

## 2024-02-15 DIAGNOSIS — Z79.4 TYPE 2 DIABETES MELLITUS WITH DIABETIC NEUROPATHY, WITH LONG-TERM CURRENT USE OF INSULIN (HCC): Primary | ICD-10-CM

## 2024-02-15 DIAGNOSIS — E11.40 TYPE 2 DIABETES MELLITUS WITH DIABETIC NEUROPATHY, WITH LONG-TERM CURRENT USE OF INSULIN (HCC): Primary | ICD-10-CM

## 2024-02-15 RX ORDER — PEN NEEDLE, DIABETIC 31 GX5/16"
1 NEEDLE, DISPOSABLE MISCELLANEOUS NIGHTLY
Qty: 100 EACH | Refills: 5 | Status: SHIPPED | OUTPATIENT
Start: 2024-02-15

## 2024-02-19 RX ORDER — CEPHALEXIN 500 MG/1
CAPSULE ORAL DAILY
Qty: 90 CAPSULE | Refills: 3 | Status: SHIPPED | OUTPATIENT
Start: 2024-02-19

## 2024-03-12 ENCOUNTER — TELEPHONE (OUTPATIENT)
Age: 79
End: 2024-03-12

## 2024-03-13 DIAGNOSIS — N20.0 KIDNEY STONES: Primary | ICD-10-CM

## 2024-03-15 ENCOUNTER — HOSPITAL ENCOUNTER (OUTPATIENT)
Facility: HOSPITAL | Age: 79
Discharge: HOME | End: 2024-03-15
Payer: COMMERCIAL

## 2024-03-15 DIAGNOSIS — N20.0 KIDNEY STONES: ICD-10-CM

## 2024-03-15 PROCEDURE — 76770 US EXAM ABDO BACK WALL COMP: CPT

## 2024-03-21 DIAGNOSIS — E11.40 TYPE 2 DIABETES MELLITUS WITH DIABETIC NEUROPATHY, WITH LONG-TERM CURRENT USE OF INSULIN (HCC): ICD-10-CM

## 2024-03-21 DIAGNOSIS — Z79.4 TYPE 2 DIABETES MELLITUS WITH DIABETIC NEUROPATHY, WITH LONG-TERM CURRENT USE OF INSULIN (HCC): ICD-10-CM

## 2024-03-21 DIAGNOSIS — M54.31 RIGHT SCIATIC NERVE PAIN: ICD-10-CM

## 2024-03-22 RX ORDER — LIDOCAINE 50 MG/G
1 PATCH TOPICAL EVERY 24 HOURS
Qty: 90 PATCH | Refills: 1 | Status: SHIPPED | OUTPATIENT
Start: 2024-03-22

## 2024-03-22 RX ORDER — COLESEVELAM 180 1/1
TABLET ORAL
Qty: 540 TABLET | Refills: 0 | Status: SHIPPED | OUTPATIENT
Start: 2024-03-22

## 2024-03-22 RX ORDER — TIRZEPATIDE 5 MG/.5ML
INJECTION, SOLUTION SUBCUTANEOUS
Qty: 6 ML | Refills: 1 | Status: SHIPPED | OUTPATIENT
Start: 2024-03-22

## 2024-03-22 RX ORDER — PREGABALIN 200 MG/1
CAPSULE ORAL
Qty: 270 CAPSULE | Refills: 0 | Status: SHIPPED | OUTPATIENT
Start: 2024-03-22 | End: 2024-06-22

## 2024-03-25 ENCOUNTER — OFFICE VISIT (OUTPATIENT)
Age: 79
End: 2024-03-25
Payer: COMMERCIAL

## 2024-03-25 VITALS — HEIGHT: 69 IN | BODY MASS INDEX: 35.4 KG/M2 | WEIGHT: 239 LBS

## 2024-03-25 DIAGNOSIS — N13.8 BPH WITH OBSTRUCTION/LOWER URINARY TRACT SYMPTOMS: ICD-10-CM

## 2024-03-25 DIAGNOSIS — E11.40 TYPE 2 DIABETES MELLITUS WITH DIABETIC NEUROPATHY, WITH LONG-TERM CURRENT USE OF INSULIN (HCC): Primary | ICD-10-CM

## 2024-03-25 DIAGNOSIS — Z79.4 TYPE 2 DIABETES MELLITUS WITH DIABETIC NEUROPATHY, WITH LONG-TERM CURRENT USE OF INSULIN (HCC): Primary | ICD-10-CM

## 2024-03-25 DIAGNOSIS — N30.00 ACUTE CYSTITIS WITHOUT HEMATURIA: ICD-10-CM

## 2024-03-25 DIAGNOSIS — N40.1 BPH WITH OBSTRUCTION/LOWER URINARY TRACT SYMPTOMS: ICD-10-CM

## 2024-03-25 DIAGNOSIS — N20.0 KIDNEY STONES: Primary | ICD-10-CM

## 2024-03-25 DIAGNOSIS — B96.89 BACTERIAL PROSTATITIS: ICD-10-CM

## 2024-03-25 DIAGNOSIS — N41.8 BACTERIAL PROSTATITIS: ICD-10-CM

## 2024-03-25 LAB
BILIRUBIN, URINE, POC: NEGATIVE
BLOOD URINE, POC: NEGATIVE
GLUCOSE URINE, POC: 1000
KETONES, URINE, POC: NEGATIVE
LEUKOCYTE ESTERASE, URINE, POC: NEGATIVE
NITRITE, URINE, POC: NEGATIVE
PH, URINE, POC: 5 (ref 4.6–8)
PROTEIN,URINE, POC: NEGATIVE
SPECIFIC GRAVITY, URINE, POC: 1.01 (ref 1–1.03)
URINALYSIS CLARITY, POC: CLEAR
URINALYSIS COLOR, POC: YELLOW
UROBILINOGEN, POC: NORMAL

## 2024-03-25 PROCEDURE — 99214 OFFICE O/P EST MOD 30 MIN: CPT | Performed by: UROLOGY

## 2024-03-25 PROCEDURE — 1123F ACP DISCUSS/DSCN MKR DOCD: CPT | Performed by: UROLOGY

## 2024-03-25 PROCEDURE — 81003 URINALYSIS AUTO W/O SCOPE: CPT | Performed by: UROLOGY

## 2024-03-25 RX ORDER — CEPHALEXIN 500 MG/1
500 CAPSULE ORAL
Qty: 90 CAPSULE | Refills: 3 | Status: SHIPPED | OUTPATIENT
Start: 2024-03-25

## 2024-03-25 RX ORDER — DAPAGLIFLOZIN 5 MG/1
5 TABLET, FILM COATED ORAL DAILY
Qty: 90 TABLET | Refills: 1 | Status: SHIPPED | OUTPATIENT
Start: 2024-03-25

## 2024-03-25 RX ORDER — POTASSIUM CITRATE 15 MEQ/1
15 TABLET, EXTENDED RELEASE ORAL 2 TIMES DAILY WITH MEALS
Qty: 180 TABLET | Refills: 5 | Status: SHIPPED | OUTPATIENT
Start: 2024-03-25

## 2024-03-25 NOTE — PROGRESS NOTES
HISTORY OF PRESENT ILLNESS  Ugo Dueñas is a 78 y.o. male   Patient returns today with a history of uric acid stones on potassium citrate.  Is been controlling his stones ultrasounds unremarkable.  Patient history of bacterial prostatitis he takes Keflex suppression 500 mg a day and has had no infections ever since.  Before that time he get recurrent infections over and over and over again.    He presents fevers chills flank pain nausea vomiting weight loss or bone pain no hematuria dysuria  1. Kidney stones  Overview:  History of kidney stones, uric acid stones He is on potassium citrate.     US from 3/14/2024 -Normal sonographic appearance of the kidneys and bladder.   Orders:  -     AMB POC URINALYSIS DIP STICK AUTO W/O MICRO  -     Potassium Citrate ER (UROCIT-K) 15 MEQ (1620 MG) TBCR extended release tablet; Take 1 tablet by mouth 2 times daily (with meals), Disp-180 tablet, R-5Normal  -     US ABDOMEN LIMITED; Future  2. BPH with obstruction/lower urinary tract symptoms  Overview:  PSA=0.3 from 1/2022   3. Acute cystitis without hematuria  Overview:  He takes keflex as prophylaxis for frequent UTI infection   Orders:  -     AMB POC URINALYSIS DIP STICK AUTO W/O MICRO  4. Bacterial prostatitis  -     cephALEXin (KEFLEX) 500 MG capsule; Take 1 capsule by mouth nightly, Disp-90 capsule, R-3Normal        PAST MEDICAL HISTORY  PMHx (including negatives):  has a past medical history of Colon polyps, Diabetes (HCC), Hypercholesterolemia, Hypertension, Hypokalemia, Hypothyroidism, Kidney stones, Neuropathy, Prostatitis, and Psychiatric disorder.   PSurgHx:  has a past surgical history that includes Colonoscopy (N/A, 3/18/2022); Cataract removal; Lithotripsy; Colonoscopy (12/15/2016); and Colonoscopy (11/21/2013).  PSocHx:  reports that he has never smoked. He has never used smokeless tobacco. He reports that he does not currently use alcohol after a past usage of about 2.0 standard drinks of alcohol per week.

## 2024-04-15 ENCOUNTER — OFFICE VISIT (OUTPATIENT)
Age: 79
End: 2024-04-15
Payer: COMMERCIAL

## 2024-04-15 VITALS
BODY MASS INDEX: 35.4 KG/M2 | RESPIRATION RATE: 17 BRPM | SYSTOLIC BLOOD PRESSURE: 120 MMHG | HEIGHT: 69 IN | DIASTOLIC BLOOD PRESSURE: 70 MMHG | WEIGHT: 239 LBS | HEART RATE: 86 BPM | OXYGEN SATURATION: 96 %

## 2024-04-15 DIAGNOSIS — H92.03 OTALGIA, BILATERAL: Primary | ICD-10-CM

## 2024-04-15 DIAGNOSIS — H90.3 ASYMMETRIC SNHL (SENSORINEURAL HEARING LOSS): ICD-10-CM

## 2024-04-15 DIAGNOSIS — J30.2 SEASONAL ALLERGIES: ICD-10-CM

## 2024-04-15 DIAGNOSIS — H90.3 SENSORINEURAL HEARING LOSS, BILATERAL: ICD-10-CM

## 2024-04-15 DIAGNOSIS — H60.543 DERMATITIS OF BOTH EAR CANALS: ICD-10-CM

## 2024-04-15 DIAGNOSIS — R26.89 IMBALANCE: ICD-10-CM

## 2024-04-15 PROCEDURE — 99214 OFFICE O/P EST MOD 30 MIN: CPT | Performed by: OTOLARYNGOLOGY

## 2024-04-15 PROCEDURE — 3078F DIAST BP <80 MM HG: CPT | Performed by: OTOLARYNGOLOGY

## 2024-04-15 PROCEDURE — 1123F ACP DISCUSS/DSCN MKR DOCD: CPT | Performed by: OTOLARYNGOLOGY

## 2024-04-15 PROCEDURE — 3074F SYST BP LT 130 MM HG: CPT | Performed by: OTOLARYNGOLOGY

## 2024-04-15 NOTE — PROGRESS NOTES
weeks PRN itching      losartan (COZAAR) 25 MG tablet Take 1 tablet by mouth 2 times daily      metoprolol succinate (TOPROL XL) 25 MG extended release tablet Take 1 tablet by mouth daily      tamsulosin (FLOMAX) 0.4 MG capsule Take 1 capsule by mouth daily       No current facility-administered medications for this visit.          Allergies:      Allergies   Allergen Reactions    Pine Hives and Itching           Social History:      Social History     Socioeconomic History    Marital status:      Spouse name: Not on file    Number of children: Not on file    Years of education: Not on file    Highest education level: Not on file   Occupational History    Not on file   Tobacco Use    Smoking status: Never    Smokeless tobacco: Never   Vaping Use    Vaping Use: Never used   Substance and Sexual Activity    Alcohol use: Not Currently     Alcohol/week: 2.0 standard drinks of alcohol     Types: 1 Cans of beer, 1 Standard drinks or equivalent per week     Comment: Occassionally per patient.    Drug use: Never    Sexual activity: Not on file   Other Topics Concern    Not on file   Social History Narrative    Not on file         Family History:     Family History   Problem Relation Age of Onset    No Known Problems Paternal Grandmother     No Known Problems Paternal Grandfather     No Known Problems Other     Heart Disease Father     Heart Disease Mother     Hypertension Mother     Lung Cancer Father     No Known Problems Maternal Grandmother     No Known Problems Paternal Uncle     No Known Problems Paternal Aunt     No Known Problems Maternal Uncle     No Known Problems Maternal Aunt     No Known Problems Maternal Grandfather            Review of Systems:      Consitutional: denies fever, excessive weight gain or loss.   Eyes: denies diplopia, eye pain.   Integumentary: denies new concerning skin lesions.   Ears, Nose, Mouth, Throat: denies except as per HPI.   Endocrine: denies hot or cold intolerance, increased

## 2024-04-18 RX ORDER — FLUTICASONE PROPIONATE 50 MCG
2 SPRAY, SUSPENSION (ML) NASAL DAILY
Qty: 16 G | Refills: 3 | Status: SHIPPED | OUTPATIENT
Start: 2024-04-18

## 2024-04-18 RX ORDER — AZELASTINE 1 MG/ML
1 SPRAY, METERED NASAL 2 TIMES DAILY PRN
Qty: 30 ML | Refills: 3 | Status: SHIPPED | OUTPATIENT
Start: 2024-04-18

## 2024-04-18 RX ORDER — FLUOCINOLONE ACETONIDE 0.11 MG/ML
OIL AURICULAR (OTIC)
Qty: 1 EACH | Refills: 0 | Status: SHIPPED | OUTPATIENT
Start: 2024-04-18

## 2024-04-25 RX ORDER — FLUOCINOLONE ACETONIDE 0.11 MG/ML
OIL AURICULAR (OTIC)
Qty: 20 ML | Refills: 1 | OUTPATIENT
Start: 2024-04-25

## 2024-05-01 DIAGNOSIS — N20.0 KIDNEY STONES: ICD-10-CM

## 2024-05-02 DIAGNOSIS — E11.40 TYPE 2 DIABETES MELLITUS WITH DIABETIC NEUROPATHY, UNSPECIFIED (HCC): ICD-10-CM

## 2024-05-17 DIAGNOSIS — F32.A ANXIETY AND DEPRESSION: Primary | ICD-10-CM

## 2024-05-17 DIAGNOSIS — E03.9 ACQUIRED HYPOTHYROIDISM: ICD-10-CM

## 2024-05-17 DIAGNOSIS — F41.9 ANXIETY AND DEPRESSION: Primary | ICD-10-CM

## 2024-05-17 DIAGNOSIS — E78.2 MIXED HYPERLIPIDEMIA: ICD-10-CM

## 2024-05-17 RX ORDER — ROSUVASTATIN CALCIUM 20 MG/1
20 TABLET, COATED ORAL DAILY
Qty: 90 TABLET | Refills: 1 | Status: SHIPPED | OUTPATIENT
Start: 2024-05-17

## 2024-05-17 RX ORDER — LEVOTHYROXINE SODIUM 0.05 MG/1
TABLET ORAL
Qty: 90 TABLET | Refills: 1 | Status: SHIPPED | OUTPATIENT
Start: 2024-05-17

## 2024-05-17 RX ORDER — ESCITALOPRAM OXALATE 20 MG/1
20 TABLET ORAL DAILY
Qty: 90 TABLET | Refills: 1 | Status: SHIPPED | OUTPATIENT
Start: 2024-05-17

## 2024-05-20 ENCOUNTER — TELEPHONE (OUTPATIENT)
Age: 79
End: 2024-05-20

## 2024-05-23 ENCOUNTER — TELEPHONE (OUTPATIENT)
Age: 79
End: 2024-05-23

## 2024-05-23 RX ORDER — POTASSIUM CITRATE 15 MEQ/1
15 TABLET, EXTENDED RELEASE ORAL DAILY
Qty: 90 TABLET | Refills: 3 | Status: SHIPPED | OUTPATIENT
Start: 2024-05-23

## 2024-05-23 RX ORDER — TAMSULOSIN HYDROCHLORIDE 0.4 MG/1
0.4 CAPSULE ORAL DAILY
Qty: 90 CAPSULE | Refills: 2 | Status: SHIPPED | OUTPATIENT
Start: 2024-05-23

## 2024-05-23 RX ORDER — TAMSULOSIN HYDROCHLORIDE 0.4 MG/1
CAPSULE ORAL DAILY
Qty: 90 CAPSULE | Refills: 3 | Status: SHIPPED | OUTPATIENT
Start: 2024-05-23

## 2024-05-23 NOTE — TELEPHONE ENCOUNTER
Pt needs a refill on his tamsulosin and cephalexin medication. Needs it sent [facility] [place] [facility]

## 2024-06-04 DIAGNOSIS — E11.40 TYPE 2 DIABETES MELLITUS WITH DIABETIC NEUROPATHY, WITH LONG-TERM CURRENT USE OF INSULIN (HCC): ICD-10-CM

## 2024-06-04 DIAGNOSIS — Z79.4 TYPE 2 DIABETES MELLITUS WITH DIABETIC NEUROPATHY, WITH LONG-TERM CURRENT USE OF INSULIN (HCC): ICD-10-CM

## 2024-06-04 RX ORDER — PREGABALIN 200 MG/1
CAPSULE ORAL
Qty: 270 CAPSULE | Refills: 0 | Status: SHIPPED | OUTPATIENT
Start: 2024-06-04 | End: 2024-09-04

## 2024-06-04 RX ORDER — COLESEVELAM 180 1/1
TABLET ORAL
Qty: 540 TABLET | Refills: 0 | Status: SHIPPED | OUTPATIENT
Start: 2024-06-04

## 2024-07-09 ENCOUNTER — OFFICE VISIT (OUTPATIENT)
Facility: CLINIC | Age: 79
End: 2024-07-09
Payer: COMMERCIAL

## 2024-07-09 VITALS
SYSTOLIC BLOOD PRESSURE: 123 MMHG | BODY MASS INDEX: 36.35 KG/M2 | HEIGHT: 69 IN | TEMPERATURE: 97.6 F | DIASTOLIC BLOOD PRESSURE: 63 MMHG | HEART RATE: 66 BPM | OXYGEN SATURATION: 97 % | RESPIRATION RATE: 18 BRPM | WEIGHT: 245.4 LBS

## 2024-07-09 DIAGNOSIS — F32.A ANXIETY AND DEPRESSION: ICD-10-CM

## 2024-07-09 DIAGNOSIS — N40.0 BENIGN PROSTATIC HYPERPLASIA WITHOUT LOWER URINARY TRACT SYMPTOMS: ICD-10-CM

## 2024-07-09 DIAGNOSIS — F41.9 ANXIETY AND DEPRESSION: ICD-10-CM

## 2024-07-09 DIAGNOSIS — H69.93 DYSFUNCTION OF BOTH EUSTACHIAN TUBES: ICD-10-CM

## 2024-07-09 DIAGNOSIS — H90.3 SENSORINEURAL HEARING LOSS (SNHL) OF BOTH EARS: ICD-10-CM

## 2024-07-09 DIAGNOSIS — N41.8 BACTERIAL PROSTATITIS: ICD-10-CM

## 2024-07-09 DIAGNOSIS — G47.33 OSA (OBSTRUCTIVE SLEEP APNEA): ICD-10-CM

## 2024-07-09 DIAGNOSIS — E03.9 ACQUIRED HYPOTHYROIDISM: ICD-10-CM

## 2024-07-09 DIAGNOSIS — B96.89 BACTERIAL PROSTATITIS: ICD-10-CM

## 2024-07-09 DIAGNOSIS — Z79.4 TYPE 2 DIABETES MELLITUS WITH DIABETIC NEUROPATHY, WITH LONG-TERM CURRENT USE OF INSULIN (HCC): Primary | ICD-10-CM

## 2024-07-09 DIAGNOSIS — E55.9 VITAMIN D DEFICIENCY: ICD-10-CM

## 2024-07-09 DIAGNOSIS — G62.9 NEUROPATHY: ICD-10-CM

## 2024-07-09 DIAGNOSIS — I51.89 DIASTOLIC DYSFUNCTION: ICD-10-CM

## 2024-07-09 DIAGNOSIS — E78.2 MIXED HYPERLIPIDEMIA: ICD-10-CM

## 2024-07-09 DIAGNOSIS — M54.31 RIGHT SCIATIC NERVE PAIN: ICD-10-CM

## 2024-07-09 DIAGNOSIS — E66.01 SEVERE OBESITY (BMI 35.0-35.9 WITH COMORBIDITY) (HCC): ICD-10-CM

## 2024-07-09 DIAGNOSIS — Z86.010 HISTORY OF COLON POLYPS: ICD-10-CM

## 2024-07-09 DIAGNOSIS — B00.9 HERPES: ICD-10-CM

## 2024-07-09 DIAGNOSIS — E11.40 TYPE 2 DIABETES MELLITUS WITH DIABETIC NEUROPATHY, WITH LONG-TERM CURRENT USE OF INSULIN (HCC): Primary | ICD-10-CM

## 2024-07-09 LAB — HBA1C MFR BLD: 6.7 %

## 2024-07-09 PROCEDURE — 3074F SYST BP LT 130 MM HG: CPT | Performed by: NURSE PRACTITIONER

## 2024-07-09 PROCEDURE — 3078F DIAST BP <80 MM HG: CPT | Performed by: NURSE PRACTITIONER

## 2024-07-09 PROCEDURE — 1123F ACP DISCUSS/DSCN MKR DOCD: CPT | Performed by: NURSE PRACTITIONER

## 2024-07-09 PROCEDURE — 99214 OFFICE O/P EST MOD 30 MIN: CPT | Performed by: NURSE PRACTITIONER

## 2024-07-09 PROCEDURE — 83036 HEMOGLOBIN GLYCOSYLATED A1C: CPT | Performed by: NURSE PRACTITIONER

## 2024-07-09 RX ORDER — CEPHALEXIN 500 MG/1
500 CAPSULE ORAL
Qty: 90 CAPSULE | Refills: 3 | Status: SHIPPED | OUTPATIENT
Start: 2024-07-09

## 2024-07-09 RX ORDER — MULTIVIT-MIN/IRON/FOLIC ACID/K 18-600-40
CAPSULE ORAL
COMMUNITY

## 2024-07-09 RX ORDER — TIRZEPATIDE 5 MG/.5ML
INJECTION, SOLUTION SUBCUTANEOUS
Qty: 6 ML | Refills: 1 | Status: SHIPPED | OUTPATIENT
Start: 2024-07-09

## 2024-07-09 NOTE — PROGRESS NOTES
Chief Complaint   Patient presents with    Diabetes     Follow up     Pt brought a list of the meds, went over list in chart, pt confirmed     \"Have you been to the ER, urgent care clinic since your last visit?  Hospitalized since your last visit?\"    NO    “Have you seen or consulted any other health care providers outside of [facility] since your last visit?”    NO          Click Here for Release of Records Request

## 2024-07-09 NOTE — PROGRESS NOTES
Ugo Dueñas is a 77 y.o. male who presents to the office today for the following:    Chief Complaint   Patient presents with    Diabetes        Past Medical History:   Diagnosis Date    Colon polyps     Diabetes (HCC)     Hypercholesterolemia     Hypertension     Hypokalemia     Hypothyroidism     Kidney stones     Neuropathy     Prostatitis     Psychiatric disorder     depression/anxiety        Past Surgical History:   Procedure Laterality Date    CATARACT REMOVAL       bilateral cataract surgery     COLONOSCOPY N/A 3/18/2022    COLONOSCOPY ([facility]) performed by Rajendra Knapp MD at [facility] ENDOSCOPY    COLONOSCOPY  12/15/2016    no ployps    COLONOSCOPY  11/21/2013    12 polyps    LITHOTRIPSY      kidney stone removal        Family History   Problem Relation Age of Onset    No Known Problems Paternal Grandmother     No Known Problems Paternal Grandfather     No Known Problems Other     Heart Disease Father     Heart Disease Mother     Hypertension Mother     Lung Cancer Father     No Known Problems Maternal Grandmother     No Known Problems Paternal Uncle     No Known Problems Paternal Aunt     No Known Problems Maternal Uncle     No Known Problems Maternal Aunt     No Known Problems Maternal Grandfather         Social History     Socioeconomic History    Marital status:      Spouse name: None    Number of children: None    Years of education: None    Highest education level: None   Tobacco Use    Smoking status: Never    Smokeless tobacco: Never   Vaping Use    Vaping Use: Never used   Substance and Sexual Activity    Alcohol use: Not Currently     Alcohol/week: 2.0 standard drinks of alcohol     Types: 1 Cans of beer, 1 Standard drinks or equivalent per week     Comment: Occassionally per patient.    Drug use: Never     Social Determinants of Health     Financial Resource Strain: Low Risk  (8/1/2023)    Overall Financial Resource Strain (CARDIA)     Difficulty of Paying Living Expenses: Not

## 2024-07-17 LAB
25(OH)D3+25(OH)D2 SERPL-MCNC: 26.1 NG/ML (ref 30–100)
ALBUMIN SERPL-MCNC: 4 G/DL (ref 3.8–4.8)
ALP SERPL-CCNC: 51 IU/L (ref 44–121)
ALT SERPL-CCNC: 22 IU/L (ref 0–44)
AST SERPL-CCNC: 18 IU/L (ref 0–40)
BASOPHILS # BLD AUTO: 0.1 X10E3/UL (ref 0–0.2)
BASOPHILS NFR BLD AUTO: 1 %
BILIRUB SERPL-MCNC: 0.7 MG/DL (ref 0–1.2)
BUN SERPL-MCNC: 12 MG/DL (ref 8–27)
BUN/CREAT SERPL: 12 (ref 10–24)
CALCIUM SERPL-MCNC: 8.7 MG/DL (ref 8.6–10.2)
CHLORIDE SERPL-SCNC: 102 MMOL/L (ref 96–106)
CHOLEST SERPL-MCNC: 112 MG/DL (ref 100–199)
CO2 SERPL-SCNC: 25 MMOL/L (ref 20–29)
CREAT SERPL-MCNC: 1.02 MG/DL (ref 0.76–1.27)
EGFRCR SERPLBLD CKD-EPI 2021: 76 ML/MIN/1.73
EOSINOPHIL # BLD AUTO: 0.4 X10E3/UL (ref 0–0.4)
EOSINOPHIL NFR BLD AUTO: 6 %
ERYTHROCYTE [DISTWIDTH] IN BLOOD BY AUTOMATED COUNT: 14 % (ref 11.6–15.4)
GLOBULIN SER CALC-MCNC: 2.1 G/DL (ref 1.5–4.5)
GLUCOSE SERPL-MCNC: 135 MG/DL (ref 70–99)
HCT VFR BLD AUTO: 45.9 % (ref 37.5–51)
HDLC SERPL-MCNC: 36 MG/DL
HGB BLD-MCNC: 15.4 G/DL (ref 13–17.7)
IMM GRANULOCYTES # BLD AUTO: 0 X10E3/UL (ref 0–0.1)
IMM GRANULOCYTES NFR BLD AUTO: 0 %
LDLC SERPL CALC-MCNC: 38 MG/DL (ref 0–99)
LYMPHOCYTES # BLD AUTO: 2.3 X10E3/UL (ref 0.7–3.1)
LYMPHOCYTES NFR BLD AUTO: 32 %
MCH RBC QN AUTO: 29.8 PG (ref 26.6–33)
MCHC RBC AUTO-ENTMCNC: 33.6 G/DL (ref 31.5–35.7)
MCV RBC AUTO: 89 FL (ref 79–97)
MONOCYTES # BLD AUTO: 0.5 X10E3/UL (ref 0.1–0.9)
MONOCYTES NFR BLD AUTO: 7 %
NEUTROPHILS # BLD AUTO: 4 X10E3/UL (ref 1.4–7)
NEUTROPHILS NFR BLD AUTO: 54 %
PLATELET # BLD AUTO: 119 X10E3/UL (ref 150–450)
POTASSIUM SERPL-SCNC: 5.5 MMOL/L (ref 3.5–5.2)
PROT SERPL-MCNC: 6.1 G/DL (ref 6–8.5)
PSA SERPL-MCNC: 0.3 NG/ML (ref 0–4)
RBC # BLD AUTO: 5.17 X10E6/UL (ref 4.14–5.8)
REFLEX CRITERIA: NORMAL
SODIUM SERPL-SCNC: 138 MMOL/L (ref 134–144)
SPECIMEN STATUS REPORT: NORMAL
TRIGL SERPL-MCNC: 245 MG/DL (ref 0–149)
TSH SERPL DL<=0.005 MIU/L-ACNC: 3.3 UIU/ML (ref 0.45–4.5)
VLDLC SERPL CALC-MCNC: 38 MG/DL (ref 5–40)
WBC # BLD AUTO: 7.3 X10E3/UL (ref 3.4–10.8)

## 2024-07-19 DIAGNOSIS — J30.2 SEASONAL ALLERGIES: ICD-10-CM

## 2024-07-19 RX ORDER — FLUTICASONE PROPIONATE 50 MCG
SPRAY, SUSPENSION (ML) NASAL
Refills: 1 | OUTPATIENT
Start: 2024-07-19

## 2024-07-19 RX ORDER — FLUTICASONE PROPIONATE 50 MCG
2 SPRAY, SUSPENSION (ML) NASAL DAILY
Qty: 16 G | Refills: 3 | Status: SHIPPED | OUTPATIENT
Start: 2024-07-19

## 2024-07-19 RX ORDER — AZELASTINE HYDROCHLORIDE 137 UG/1
SPRAY, METERED NASAL
Refills: 1 | OUTPATIENT
Start: 2024-07-19

## 2024-07-19 RX ORDER — AZELASTINE 1 MG/ML
1 SPRAY, METERED NASAL 2 TIMES DAILY PRN
Qty: 30 ML | Refills: 3 | Status: SHIPPED | OUTPATIENT
Start: 2024-07-19

## 2024-07-20 LAB
ALBUMIN/CREAT UR: 45 MG/G CREAT (ref 0–29)
APPEARANCE UR: CLEAR
BACTERIA #/AREA URNS HPF: NORMAL /[HPF]
BILIRUB UR QL STRIP: NEGATIVE
CASTS URNS QL MICRO: NORMAL /LPF
COLOR UR: YELLOW
CREAT UR-MCNC: 14.6 MG/DL
EPI CELLS #/AREA URNS HPF: NORMAL /HPF (ref 0–10)
GLUCOSE UR QL STRIP: ABNORMAL
HGB UR QL STRIP: NEGATIVE
KETONES UR QL STRIP: NEGATIVE
LEUKOCYTE ESTERASE UR QL STRIP: NEGATIVE
MICRO URNS: ABNORMAL
MICRO URNS: ABNORMAL
MICROALBUMIN UR-MCNC: 6.5 UG/ML
NITRITE UR QL STRIP: NEGATIVE
PH UR STRIP: 6 [PH] (ref 5–7.5)
PROT UR QL STRIP: NEGATIVE
RBC #/AREA URNS HPF: NORMAL /HPF (ref 0–2)
SP GR UR STRIP: 1.01 (ref 1–1.03)
UROBILINOGEN UR STRIP-MCNC: 0.2 MG/DL (ref 0.2–1)
WBC #/AREA URNS HPF: NORMAL /HPF (ref 0–5)

## 2024-08-28 DIAGNOSIS — Z79.4 TYPE 2 DIABETES MELLITUS WITH DIABETIC NEUROPATHY, WITH LONG-TERM CURRENT USE OF INSULIN (HCC): ICD-10-CM

## 2024-08-28 DIAGNOSIS — M54.31 RIGHT SCIATIC NERVE PAIN: ICD-10-CM

## 2024-08-28 DIAGNOSIS — E11.40 TYPE 2 DIABETES MELLITUS WITH DIABETIC NEUROPATHY, WITH LONG-TERM CURRENT USE OF INSULIN (HCC): ICD-10-CM

## 2024-08-28 RX ORDER — LIDOCAINE 50 MG/G
PATCH TOPICAL
Qty: 90 PATCH | Refills: 1 | Status: SHIPPED | OUTPATIENT
Start: 2024-08-28

## 2024-08-28 RX ORDER — DAPAGLIFLOZIN 5 MG/1
5 TABLET, FILM COATED ORAL DAILY
Qty: 90 TABLET | Refills: 1 | Status: SHIPPED | OUTPATIENT
Start: 2024-08-28

## 2024-08-28 RX ORDER — COLESEVELAM 180 1/1
TABLET ORAL
Qty: 540 TABLET | Refills: 0 | Status: SHIPPED | OUTPATIENT
Start: 2024-08-28

## 2024-09-20 LAB
BASOPHILS # BLD AUTO: 0.1 X10E3/UL (ref 0–0.2)
BASOPHILS NFR BLD AUTO: 1 %
BUN SERPL-MCNC: 12 MG/DL (ref 8–27)
BUN/CREAT SERPL: 12 (ref 10–24)
CALCIUM SERPL-MCNC: 9 MG/DL (ref 8.6–10.2)
CHLORIDE SERPL-SCNC: 101 MMOL/L (ref 96–106)
CO2 SERPL-SCNC: 22 MMOL/L (ref 20–29)
CREAT SERPL-MCNC: 1.01 MG/DL (ref 0.76–1.27)
EGFRCR SERPLBLD CKD-EPI 2021: 77 ML/MIN/1.73
EOSINOPHIL # BLD AUTO: 0.3 X10E3/UL (ref 0–0.4)
EOSINOPHIL NFR BLD AUTO: 5 %
ERYTHROCYTE [DISTWIDTH] IN BLOOD BY AUTOMATED COUNT: 13.6 % (ref 11.6–15.4)
GLUCOSE SERPL-MCNC: 106 MG/DL (ref 70–99)
HCT VFR BLD AUTO: 49.1 % (ref 37.5–51)
HGB BLD-MCNC: 15.7 G/DL (ref 13–17.7)
IMM GRANULOCYTES # BLD AUTO: 0 X10E3/UL (ref 0–0.1)
IMM GRANULOCYTES NFR BLD AUTO: 0 %
LYMPHOCYTES # BLD AUTO: 2.2 X10E3/UL (ref 0.7–3.1)
LYMPHOCYTES NFR BLD AUTO: 36 %
MCH RBC QN AUTO: 30 PG (ref 26.6–33)
MCHC RBC AUTO-ENTMCNC: 32 G/DL (ref 31.5–35.7)
MCV RBC AUTO: 94 FL (ref 79–97)
MONOCYTES # BLD AUTO: 0.5 X10E3/UL (ref 0.1–0.9)
MONOCYTES NFR BLD AUTO: 8 %
NEUTROPHILS # BLD AUTO: 3.2 X10E3/UL (ref 1.4–7)
NEUTROPHILS NFR BLD AUTO: 50 %
PLATELET # BLD AUTO: 133 X10E3/UL (ref 150–450)
POTASSIUM SERPL-SCNC: 4.9 MMOL/L (ref 3.5–5.2)
RBC # BLD AUTO: 5.24 X10E6/UL (ref 4.14–5.8)
SODIUM SERPL-SCNC: 138 MMOL/L (ref 134–144)
WBC # BLD AUTO: 6.2 X10E3/UL (ref 3.4–10.8)

## 2024-10-03 DIAGNOSIS — E11.40 TYPE 2 DIABETES MELLITUS WITH DIABETIC NEUROPATHY, WITH LONG-TERM CURRENT USE OF INSULIN (HCC): ICD-10-CM

## 2024-10-03 DIAGNOSIS — Z79.4 TYPE 2 DIABETES MELLITUS WITH DIABETIC NEUROPATHY, WITH LONG-TERM CURRENT USE OF INSULIN (HCC): ICD-10-CM

## 2024-10-04 RX ORDER — PREGABALIN 200 MG/1
CAPSULE ORAL
Qty: 270 CAPSULE | Refills: 0 | Status: SHIPPED | OUTPATIENT
Start: 2024-10-04 | End: 2024-11-04

## 2024-10-15 RX ORDER — FLUOCINOLONE ACETONIDE 0.11 MG/ML
OIL AURICULAR (OTIC)
Qty: 20 ML | Refills: 1 | Status: SHIPPED | OUTPATIENT
Start: 2024-10-15

## 2024-10-16 ENCOUNTER — NURSE ONLY (OUTPATIENT)
Facility: CLINIC | Age: 79
End: 2024-10-16
Payer: COMMERCIAL

## 2024-10-16 DIAGNOSIS — Z23 NEEDS FLU SHOT: Primary | ICD-10-CM

## 2024-10-16 PROCEDURE — 90471 IMMUNIZATION ADMIN: CPT | Performed by: NURSE PRACTITIONER

## 2024-10-16 PROCEDURE — 90653 IIV ADJUVANT VACCINE IM: CPT | Performed by: NURSE PRACTITIONER

## 2024-10-21 DIAGNOSIS — F41.9 ANXIETY AND DEPRESSION: ICD-10-CM

## 2024-10-21 DIAGNOSIS — E78.2 MIXED HYPERLIPIDEMIA: ICD-10-CM

## 2024-10-21 DIAGNOSIS — F32.A ANXIETY AND DEPRESSION: ICD-10-CM

## 2024-10-21 DIAGNOSIS — E03.9 ACQUIRED HYPOTHYROIDISM: ICD-10-CM

## 2024-10-21 RX ORDER — ESCITALOPRAM OXALATE 20 MG/1
20 TABLET ORAL DAILY
Qty: 90 TABLET | Refills: 1 | Status: SHIPPED | OUTPATIENT
Start: 2024-10-21

## 2024-10-21 RX ORDER — LEVOTHYROXINE SODIUM 50 UG/1
TABLET ORAL
Qty: 90 TABLET | Refills: 1 | Status: SHIPPED | OUTPATIENT
Start: 2024-10-21

## 2024-10-21 RX ORDER — ROSUVASTATIN CALCIUM 20 MG/1
20 TABLET, COATED ORAL DAILY
Qty: 90 TABLET | Refills: 1 | Status: SHIPPED | OUTPATIENT
Start: 2024-10-21

## 2024-11-11 ENCOUNTER — TELEPHONE (OUTPATIENT)
Facility: HOSPITAL | Age: 79
End: 2024-11-11

## 2024-12-01 DIAGNOSIS — E11.40 TYPE 2 DIABETES MELLITUS WITH DIABETIC NEUROPATHY, UNSPECIFIED (HCC): ICD-10-CM

## 2024-12-19 DIAGNOSIS — Z79.4 TYPE 2 DIABETES MELLITUS WITH DIABETIC NEUROPATHY, WITH LONG-TERM CURRENT USE OF INSULIN (HCC): ICD-10-CM

## 2024-12-19 DIAGNOSIS — E11.40 TYPE 2 DIABETES MELLITUS WITH DIABETIC NEUROPATHY, WITH LONG-TERM CURRENT USE OF INSULIN (HCC): ICD-10-CM

## 2024-12-19 RX ORDER — INSULIN DEGLUDEC 100 U/ML
INJECTION, SOLUTION SUBCUTANEOUS
Qty: 30 ML | Refills: 3 | Status: SHIPPED | OUTPATIENT
Start: 2024-12-19

## 2024-12-19 RX ORDER — PREGABALIN 200 MG/1
CAPSULE ORAL
Qty: 270 CAPSULE | Refills: 0 | Status: SHIPPED | OUTPATIENT
Start: 2024-12-19 | End: 2025-03-19

## 2024-12-26 DIAGNOSIS — Z79.4 TYPE 2 DIABETES MELLITUS WITH DIABETIC NEUROPATHY, WITH LONG-TERM CURRENT USE OF INSULIN (HCC): ICD-10-CM

## 2024-12-26 DIAGNOSIS — E11.40 TYPE 2 DIABETES MELLITUS WITH DIABETIC NEUROPATHY, WITH LONG-TERM CURRENT USE OF INSULIN (HCC): ICD-10-CM

## 2024-12-26 RX ORDER — PREGABALIN 200 MG/1
CAPSULE ORAL
Qty: 270 CAPSULE | Refills: 0 | OUTPATIENT
Start: 2024-12-26

## 2025-01-09 ENCOUNTER — OFFICE VISIT (OUTPATIENT)
Facility: CLINIC | Age: 80
End: 2025-01-09
Payer: COMMERCIAL

## 2025-01-09 VITALS
HEIGHT: 69 IN | RESPIRATION RATE: 18 BRPM | SYSTOLIC BLOOD PRESSURE: 153 MMHG | DIASTOLIC BLOOD PRESSURE: 78 MMHG | TEMPERATURE: 98.4 F | OXYGEN SATURATION: 97 % | WEIGHT: 251 LBS | HEART RATE: 59 BPM | BODY MASS INDEX: 37.18 KG/M2

## 2025-01-09 DIAGNOSIS — E55.9 VITAMIN D DEFICIENCY: ICD-10-CM

## 2025-01-09 DIAGNOSIS — E66.01 SEVERE OBESITY (BMI 35.0-35.9 WITH COMORBIDITY): ICD-10-CM

## 2025-01-09 DIAGNOSIS — E78.2 MIXED HYPERLIPIDEMIA: ICD-10-CM

## 2025-01-09 DIAGNOSIS — E03.9 ACQUIRED HYPOTHYROIDISM: ICD-10-CM

## 2025-01-09 DIAGNOSIS — H90.3 SENSORINEURAL HEARING LOSS (SNHL) OF BOTH EARS: ICD-10-CM

## 2025-01-09 DIAGNOSIS — Z86.0100 HISTORY OF COLON POLYPS: ICD-10-CM

## 2025-01-09 DIAGNOSIS — F41.9 ANXIETY AND DEPRESSION: ICD-10-CM

## 2025-01-09 DIAGNOSIS — Z79.4 TYPE 2 DIABETES MELLITUS WITH DIABETIC NEUROPATHY, WITH LONG-TERM CURRENT USE OF INSULIN (HCC): Primary | ICD-10-CM

## 2025-01-09 DIAGNOSIS — H69.93 DYSFUNCTION OF BOTH EUSTACHIAN TUBES: ICD-10-CM

## 2025-01-09 DIAGNOSIS — I51.89 DIASTOLIC DYSFUNCTION: ICD-10-CM

## 2025-01-09 DIAGNOSIS — B00.9 HERPES: ICD-10-CM

## 2025-01-09 DIAGNOSIS — G62.9 NEUROPATHY: ICD-10-CM

## 2025-01-09 DIAGNOSIS — F32.A ANXIETY AND DEPRESSION: ICD-10-CM

## 2025-01-09 DIAGNOSIS — G47.33 OSA (OBSTRUCTIVE SLEEP APNEA): ICD-10-CM

## 2025-01-09 DIAGNOSIS — I10 ESSENTIAL HYPERTENSION: ICD-10-CM

## 2025-01-09 DIAGNOSIS — M54.31 RIGHT SCIATIC NERVE PAIN: ICD-10-CM

## 2025-01-09 DIAGNOSIS — E11.40 TYPE 2 DIABETES MELLITUS WITH DIABETIC NEUROPATHY, WITH LONG-TERM CURRENT USE OF INSULIN (HCC): Primary | ICD-10-CM

## 2025-01-09 DIAGNOSIS — N40.0 BENIGN PROSTATIC HYPERPLASIA WITHOUT LOWER URINARY TRACT SYMPTOMS: ICD-10-CM

## 2025-01-09 PROBLEM — I45.2 BIFASCICULAR BLOCK: Status: ACTIVE | Noted: 2024-11-11

## 2025-01-09 PROBLEM — Z87.442 HISTORY OF RENAL CALCULI: Status: ACTIVE | Noted: 2025-01-09

## 2025-01-09 PROBLEM — I20.89 ATYPICAL ANGINA (HCC): Status: ACTIVE | Noted: 2024-11-11

## 2025-01-09 PROBLEM — I05.0 MITRAL VALVE STENOSIS: Status: ACTIVE | Noted: 2024-11-11

## 2025-01-09 PROBLEM — R06.00 DYSPNEA: Status: ACTIVE | Noted: 2025-01-09

## 2025-01-09 PROBLEM — I50.32 CHRONIC DIASTOLIC HEART FAILURE (HCC): Status: ACTIVE | Noted: 2025-01-09

## 2025-01-09 PROBLEM — R42 DIZZINESS: Status: ACTIVE | Noted: 2024-11-11

## 2025-01-09 LAB — HBA1C MFR BLD: 8 %

## 2025-01-09 PROCEDURE — 1123F ACP DISCUSS/DSCN MKR DOCD: CPT | Performed by: NURSE PRACTITIONER

## 2025-01-09 PROCEDURE — 3077F SYST BP >= 140 MM HG: CPT | Performed by: NURSE PRACTITIONER

## 2025-01-09 PROCEDURE — 99214 OFFICE O/P EST MOD 30 MIN: CPT | Performed by: NURSE PRACTITIONER

## 2025-01-09 PROCEDURE — 83036 HEMOGLOBIN GLYCOSYLATED A1C: CPT | Performed by: NURSE PRACTITIONER

## 2025-01-09 PROCEDURE — 3078F DIAST BP <80 MM HG: CPT | Performed by: NURSE PRACTITIONER

## 2025-01-09 RX ORDER — SEMAGLUTIDE 0.68 MG/ML
INJECTION, SOLUTION SUBCUTANEOUS
Qty: 9 ML | Refills: 1 | Status: SHIPPED | OUTPATIENT
Start: 2025-01-09 | End: 2025-02-22

## 2025-01-09 SDOH — ECONOMIC STABILITY: FOOD INSECURITY: WITHIN THE PAST 12 MONTHS, YOU WORRIED THAT YOUR FOOD WOULD RUN OUT BEFORE YOU GOT MONEY TO BUY MORE.: NEVER TRUE

## 2025-01-09 SDOH — ECONOMIC STABILITY: FOOD INSECURITY: WITHIN THE PAST 12 MONTHS, THE FOOD YOU BOUGHT JUST DIDN'T LAST AND YOU DIDN'T HAVE MONEY TO GET MORE.: NEVER TRUE

## 2025-01-09 NOTE — PROGRESS NOTES
Chief Complaint   Patient presents with    Diabetes     Follow up     Pt just took his BP meds before his appt.    Pt brought in meds went over list in chart, pt confirmed         \"Have you been to the ER, urgent care clinic since your last visit?  Hospitalized since your last visit?\"    NO    “Have you seen or consulted any other health care providers outside our system since your last visit?”    NO           
sclerosis of the aortic valve cusp.    Mitral Valve: Moderate annular calcification at the posterior leaflet of the mitral valve. Mild regurgitation. Mild to moderate stenosis noted. MV mean gradient is 7 mmHg.    Tricuspid Valve: Mild annular calcification.    Left Atrium: Left atrium is mildly dilated.    Aorta: Mildly dilated aortic root. Mildly dilated sinus of Valsalva.  Follows with [facility]     9. Neuropathy  Continue on lyrica as directed for pain associated with neuropathy in hands and feet  Advised do not drive or operate machinery if experiencing drowsiness or otherwise feel impaired while taking medication.   Controlled substance agreement discussed previously and signed   Understands proper use, storage and disposal of medication     10.Hypothyroidism  Lab Results   Component Value Date    TSH 3.520 01/09/2025   On levothyroxine 50mcq daily   Check TSH with labs    11. Sensorineural hearing loss (SNHL) of both ears  Wears hearing aids    12. Dysfunction of both eustachian tubes  Improved and continues on flonase and azelestine prn  Did have eval with ENT who continued similar management    13. Right sciatic nerve pain  No persistent radicular symptoms   Declines further eval with imaging unless worsening  May use heat and tylenol prn  Encourage home exercises to complete daily to help with strengthening and stretching  If symptoms worsening, advised to follow up with provider.     14. Vitamin D deficiency  Lab Results   Component Value Date/Time    VITD25 26.1 07/16/2024 08:46 AM   Continue vitamin d 2000 units daily    15. Obstructive sleep apnea  Has declined treatment with cpap therapy.  Benefits and risks of not treating were discussed with patient by cardiologist.   Continue to work on weight reduction with diet and exercise as noted above.  Sleep with head elevated and avoid sedative medications/etoh.    16. Hypertension  Blood pressure is not at goal but took medicine prior to

## 2025-01-10 LAB
ALBUMIN SERPL-MCNC: 4.6 G/DL (ref 3.8–4.8)
ALP SERPL-CCNC: 53 IU/L (ref 44–121)
ALT SERPL-CCNC: 20 IU/L (ref 0–44)
APPEARANCE UR: CLEAR
AST SERPL-CCNC: 18 IU/L (ref 0–40)
BACTERIA #/AREA URNS HPF: NORMAL /[HPF]
BASOPHILS # BLD AUTO: 0.1 X10E3/UL (ref 0–0.2)
BASOPHILS NFR BLD AUTO: 1 %
BILIRUB SERPL-MCNC: 0.7 MG/DL (ref 0–1.2)
BILIRUB UR QL STRIP: NEGATIVE
BUN SERPL-MCNC: 13 MG/DL (ref 8–27)
BUN/CREAT SERPL: 13 (ref 10–24)
CALCIUM SERPL-MCNC: 9.1 MG/DL (ref 8.6–10.2)
CASTS URNS QL MICRO: NORMAL /LPF
CHLORIDE SERPL-SCNC: 99 MMOL/L (ref 96–106)
CHOLEST SERPL-MCNC: 107 MG/DL (ref 100–199)
CO2 SERPL-SCNC: 22 MMOL/L (ref 20–29)
COLOR UR: YELLOW
CREAT SERPL-MCNC: 1.04 MG/DL (ref 0.76–1.27)
EGFRCR SERPLBLD CKD-EPI 2021: 74 ML/MIN/1.73
EOSINOPHIL # BLD AUTO: 0.5 X10E3/UL (ref 0–0.4)
EOSINOPHIL NFR BLD AUTO: 6 %
EPI CELLS #/AREA URNS HPF: NORMAL /HPF (ref 0–10)
ERYTHROCYTE [DISTWIDTH] IN BLOOD BY AUTOMATED COUNT: 13.3 % (ref 11.6–15.4)
GLOBULIN SER CALC-MCNC: 2.1 G/DL (ref 1.5–4.5)
GLUCOSE SERPL-MCNC: 123 MG/DL (ref 70–99)
GLUCOSE UR QL STRIP: ABNORMAL
HCT VFR BLD AUTO: 51.2 % (ref 37.5–51)
HDLC SERPL-MCNC: 37 MG/DL
HGB BLD-MCNC: 16.9 G/DL (ref 13–17.7)
HGB UR QL STRIP: NEGATIVE
IMM GRANULOCYTES # BLD AUTO: 0 X10E3/UL (ref 0–0.1)
IMM GRANULOCYTES NFR BLD AUTO: 0 %
KETONES UR QL STRIP: NEGATIVE
LDLC SERPL CALC-MCNC: 39 MG/DL (ref 0–99)
LEUKOCYTE ESTERASE UR QL STRIP: NEGATIVE
LYMPHOCYTES # BLD AUTO: 2.4 X10E3/UL (ref 0.7–3.1)
LYMPHOCYTES NFR BLD AUTO: 33 %
MCH RBC QN AUTO: 30.4 PG (ref 26.6–33)
MCHC RBC AUTO-ENTMCNC: 33 G/DL (ref 31.5–35.7)
MCV RBC AUTO: 92 FL (ref 79–97)
MICRO URNS: ABNORMAL
MICRO URNS: ABNORMAL
MONOCYTES # BLD AUTO: 0.5 X10E3/UL (ref 0.1–0.9)
MONOCYTES NFR BLD AUTO: 6 %
NEUTROPHILS # BLD AUTO: 3.8 X10E3/UL (ref 1.4–7)
NEUTROPHILS NFR BLD AUTO: 54 %
NITRITE UR QL STRIP: NEGATIVE
PH UR STRIP: 6.5 [PH] (ref 5–7.5)
PLATELET # BLD AUTO: 128 X10E3/UL (ref 150–450)
POTASSIUM SERPL-SCNC: 5.1 MMOL/L (ref 3.5–5.2)
PROT SERPL-MCNC: 6.7 G/DL (ref 6–8.5)
PROT UR QL STRIP: NEGATIVE
RBC # BLD AUTO: 5.56 X10E6/UL (ref 4.14–5.8)
RBC #/AREA URNS HPF: NORMAL /HPF (ref 0–2)
SODIUM SERPL-SCNC: 140 MMOL/L (ref 134–144)
SP GR UR STRIP: 1.01 (ref 1–1.03)
TRIGL SERPL-MCNC: 190 MG/DL (ref 0–149)
TSH SERPL DL<=0.005 MIU/L-ACNC: 3.52 UIU/ML (ref 0.45–4.5)
UROBILINOGEN UR STRIP-MCNC: 0.2 MG/DL (ref 0.2–1)
VLDLC SERPL CALC-MCNC: 31 MG/DL (ref 5–40)
WBC # BLD AUTO: 7.2 X10E3/UL (ref 3.4–10.8)
WBC #/AREA URNS HPF: NORMAL /HPF (ref 0–5)

## 2025-01-12 LAB
ALBUMIN/CREAT UR: 182 MG/G CREAT (ref 0–29)
CREAT UR-MCNC: 29.3 MG/DL
MICROALBUMIN UR-MCNC: 53.2 UG/ML

## 2025-02-18 DIAGNOSIS — Z79.4 TYPE 2 DIABETES MELLITUS WITH DIABETIC NEUROPATHY, WITH LONG-TERM CURRENT USE OF INSULIN (HCC): ICD-10-CM

## 2025-02-18 DIAGNOSIS — E11.40 TYPE 2 DIABETES MELLITUS WITH DIABETIC NEUROPATHY, WITH LONG-TERM CURRENT USE OF INSULIN (HCC): ICD-10-CM

## 2025-02-18 RX ORDER — COLESEVELAM 180 1/1
TABLET ORAL
Qty: 540 TABLET | Refills: 0 | Status: SHIPPED | OUTPATIENT
Start: 2025-02-18

## 2025-03-10 ENCOUNTER — HOSPITAL ENCOUNTER (OUTPATIENT)
Facility: HOSPITAL | Age: 80
Discharge: HOME | End: 2025-03-13
Attending: UROLOGY
Payer: COMMERCIAL

## 2025-03-10 DIAGNOSIS — N20.0 KIDNEY STONES: ICD-10-CM

## 2025-03-10 DIAGNOSIS — E11.40 TYPE 2 DIABETES MELLITUS WITH DIABETIC NEUROPATHY, WITH LONG-TERM CURRENT USE OF INSULIN (HCC): ICD-10-CM

## 2025-03-10 DIAGNOSIS — Z79.4 TYPE 2 DIABETES MELLITUS WITH DIABETIC NEUROPATHY, WITH LONG-TERM CURRENT USE OF INSULIN (HCC): ICD-10-CM

## 2025-03-10 PROCEDURE — 76770 US EXAM ABDO BACK WALL COMP: CPT

## 2025-03-10 RX ORDER — DAPAGLIFLOZIN 5 MG/1
5 TABLET, FILM COATED ORAL DAILY
Qty: 90 TABLET | Refills: 1 | Status: SHIPPED | OUTPATIENT
Start: 2025-03-10

## 2025-03-19 ENCOUNTER — RESULTS FOLLOW-UP (OUTPATIENT)
Facility: HOSPITAL | Age: 80
End: 2025-03-19

## 2025-03-24 ENCOUNTER — OFFICE VISIT (OUTPATIENT)
Age: 80
End: 2025-03-24
Payer: COMMERCIAL

## 2025-03-24 VITALS — HEART RATE: 71 BPM | DIASTOLIC BLOOD PRESSURE: 95 MMHG | SYSTOLIC BLOOD PRESSURE: 163 MMHG

## 2025-03-24 DIAGNOSIS — N20.0 KIDNEY STONES: Primary | ICD-10-CM

## 2025-03-24 DIAGNOSIS — N41.8 BACTERIAL PROSTATITIS: ICD-10-CM

## 2025-03-24 DIAGNOSIS — B96.89 BACTERIAL PROSTATITIS: ICD-10-CM

## 2025-03-24 DIAGNOSIS — N40.1 BPH WITH OBSTRUCTION/LOWER URINARY TRACT SYMPTOMS: ICD-10-CM

## 2025-03-24 DIAGNOSIS — N13.8 BPH WITH OBSTRUCTION/LOWER URINARY TRACT SYMPTOMS: ICD-10-CM

## 2025-03-24 LAB
BILIRUBIN, URINE, POC: NEGATIVE
BLOOD URINE, POC: ABNORMAL
GLUCOSE URINE, POC: 500
KETONES, URINE, POC: NEGATIVE
LEUKOCYTE ESTERASE, URINE, POC: NEGATIVE
NITRITE, URINE, POC: NEGATIVE
PH, URINE, POC: 6 (ref 4.6–8)
PROTEIN,URINE, POC: NEGATIVE
SPECIFIC GRAVITY, URINE, POC: 1 (ref 1–1.03)
URINALYSIS CLARITY, POC: CLEAR
URINALYSIS COLOR, POC: YELLOW
UROBILINOGEN, POC: ABNORMAL

## 2025-03-24 PROCEDURE — 99214 OFFICE O/P EST MOD 30 MIN: CPT | Performed by: UROLOGY

## 2025-03-24 PROCEDURE — 3079F DIAST BP 80-89 MM HG: CPT | Performed by: UROLOGY

## 2025-03-24 PROCEDURE — 3077F SYST BP >= 140 MM HG: CPT | Performed by: UROLOGY

## 2025-03-24 PROCEDURE — 1123F ACP DISCUSS/DSCN MKR DOCD: CPT | Performed by: UROLOGY

## 2025-03-24 PROCEDURE — 81003 URINALYSIS AUTO W/O SCOPE: CPT | Performed by: UROLOGY

## 2025-03-24 RX ORDER — SEMAGLUTIDE 1.34 MG/ML
0.5 INJECTION, SOLUTION SUBCUTANEOUS WEEKLY
COMMUNITY

## 2025-03-24 RX ORDER — TAMSULOSIN HYDROCHLORIDE 0.4 MG/1
0.4 CAPSULE ORAL DAILY
Qty: 90 CAPSULE | Refills: 3 | Status: SHIPPED | OUTPATIENT
Start: 2025-03-24

## 2025-03-24 RX ORDER — CEPHALEXIN 500 MG/1
500 CAPSULE ORAL
Qty: 90 CAPSULE | Refills: 3 | Status: SHIPPED | OUTPATIENT
Start: 2025-03-24

## 2025-03-24 NOTE — PROGRESS NOTES
Chief Complaint   Patient presents with    1 Year Follow Up     KIDNEY STONES     BP (!) 163/95 (BP Site: Left Upper Arm, Patient Position: Sitting, BP Cuff Size: Medium Adult)   Pulse 71     1. Have you been to the ER, urgent care clinic since your last visit?  Hospitalized since your last visit?No    2. Have you seen or consulted any other health care providers outside of the [facility] since your last visit?  Include any pap smears or colon screening.  CARDIOLOGY WINTER BROCK NP

## 2025-03-24 NOTE — PROGRESS NOTES
HISTORY OF PRESENT ILLNESS  Ugo Dueñas is a 79 y.o. male   Patient returns today with a history of persistent infections from his prostate which is controlled with his antibiotics.  History of kidney stones no history of kidney stones recently.  History of BPH obstruction controlled well with tamsulosin  1. Kidney stones  Overview:  History of kidney stones, uric acid stones He is on potassium citrate.     US from 3/14/2024 -Normal sonographic appearance of the kidneys and bladder.   Orders:  -     AMB POC URINALYSIS DIP STICK AUTO W/O MICRO  2. BPH with obstruction/lower urinary tract symptoms  Overview:  PSA=0.3 from 1/2022     Component  Ref Range & Units (hover) 7/16/24 0846 6/13/23 0910 1/7/22 0843   PSA 0.3 0.3 CM 0.3 CM       Orders:  -     AMB POC URINALYSIS DIP STICK AUTO W/O MICRO  -     tamsulosin (FLOMAX) 0.4 MG capsule; Take 1 capsule by mouth daily, Disp-90 capsule, R-3Normal  3. Bacterial prostatitis  -     cephALEXin (KEFLEX) 500 MG capsule; Take 1 capsule by mouth nightly, Disp-90 capsule, R-3Normal        PAST MEDICAL HISTORY  PMHx (including negatives):  has a past medical history of Colon polyps, Diabetes (HCC), Hypercholesterolemia, Hypertension, Hypokalemia, Hypothyroidism, Kidney stones, Neuropathy, Prostatitis, and Psychiatric disorder.   PSurgHx:  has a past surgical history that includes Colonoscopy (N/A, 3/18/2022); Cataract removal; Lithotripsy; Colonoscopy (12/15/2016); and Colonoscopy (11/21/2013).  PSocHx:  reports that he has never smoked. He has never used smokeless tobacco. He reports that he does not currently use alcohol after a past usage of about 2.0 standard drinks of alcohol per week. He reports that he does not use drugs.   FamilyHX:   Family History   Problem Relation Age of Onset    No Known Problems Paternal Grandmother     No Known Problems Paternal Grandfather     No Known Problems Other     Heart Disease Father     Heart Disease Mother     Hypertension Mother

## 2025-03-29 DIAGNOSIS — E11.40 TYPE 2 DIABETES MELLITUS WITH DIABETIC NEUROPATHY, WITH LONG-TERM CURRENT USE OF INSULIN (HCC): ICD-10-CM

## 2025-03-29 DIAGNOSIS — M54.31 RIGHT SCIATIC NERVE PAIN: ICD-10-CM

## 2025-03-29 DIAGNOSIS — Z79.4 TYPE 2 DIABETES MELLITUS WITH DIABETIC NEUROPATHY, WITH LONG-TERM CURRENT USE OF INSULIN (HCC): ICD-10-CM

## 2025-03-30 RX ORDER — LIDOCAINE 50 MG/G
PATCH TOPICAL
Qty: 90 PATCH | Refills: 1 | Status: SHIPPED | OUTPATIENT
Start: 2025-03-30

## 2025-03-30 RX ORDER — PREGABALIN 200 MG/1
CAPSULE ORAL
Qty: 270 CAPSULE | Refills: 0 | Status: SHIPPED | OUTPATIENT
Start: 2025-03-30 | End: 2025-06-30

## 2025-04-10 ENCOUNTER — OFFICE VISIT (OUTPATIENT)
Facility: CLINIC | Age: 80
End: 2025-04-10
Payer: COMMERCIAL

## 2025-04-10 VITALS
HEIGHT: 69 IN | RESPIRATION RATE: 20 BRPM | TEMPERATURE: 98.1 F | BODY MASS INDEX: 35.7 KG/M2 | HEART RATE: 69 BPM | DIASTOLIC BLOOD PRESSURE: 72 MMHG | OXYGEN SATURATION: 97 % | SYSTOLIC BLOOD PRESSURE: 147 MMHG | WEIGHT: 241 LBS

## 2025-04-10 DIAGNOSIS — E03.9 ACQUIRED HYPOTHYROIDISM: ICD-10-CM

## 2025-04-10 DIAGNOSIS — H90.3 SENSORINEURAL HEARING LOSS (SNHL) OF BOTH EARS: ICD-10-CM

## 2025-04-10 DIAGNOSIS — Z86.0100 HISTORY OF COLON POLYPS: ICD-10-CM

## 2025-04-10 DIAGNOSIS — E55.9 VITAMIN D DEFICIENCY: ICD-10-CM

## 2025-04-10 DIAGNOSIS — H69.93 DYSFUNCTION OF BOTH EUSTACHIAN TUBES: ICD-10-CM

## 2025-04-10 DIAGNOSIS — I10 ESSENTIAL HYPERTENSION: ICD-10-CM

## 2025-04-10 DIAGNOSIS — B00.9 HERPES: ICD-10-CM

## 2025-04-10 DIAGNOSIS — E11.40 TYPE 2 DIABETES MELLITUS WITH DIABETIC NEUROPATHY, WITH LONG-TERM CURRENT USE OF INSULIN (HCC): Primary | ICD-10-CM

## 2025-04-10 DIAGNOSIS — F32.A ANXIETY AND DEPRESSION: ICD-10-CM

## 2025-04-10 DIAGNOSIS — G47.33 OSA (OBSTRUCTIVE SLEEP APNEA): ICD-10-CM

## 2025-04-10 DIAGNOSIS — F41.9 ANXIETY AND DEPRESSION: ICD-10-CM

## 2025-04-10 DIAGNOSIS — N40.0 BENIGN PROSTATIC HYPERPLASIA WITHOUT LOWER URINARY TRACT SYMPTOMS: ICD-10-CM

## 2025-04-10 DIAGNOSIS — E78.2 MIXED HYPERLIPIDEMIA: ICD-10-CM

## 2025-04-10 DIAGNOSIS — E66.01 SEVERE OBESITY (BMI 35.0-35.9 WITH COMORBIDITY): ICD-10-CM

## 2025-04-10 DIAGNOSIS — Z79.4 TYPE 2 DIABETES MELLITUS WITH DIABETIC NEUROPATHY, WITH LONG-TERM CURRENT USE OF INSULIN (HCC): Primary | ICD-10-CM

## 2025-04-10 DIAGNOSIS — I51.89 DIASTOLIC DYSFUNCTION: ICD-10-CM

## 2025-04-10 DIAGNOSIS — G62.9 NEUROPATHY: ICD-10-CM

## 2025-04-10 DIAGNOSIS — M54.31 RIGHT SCIATIC NERVE PAIN: ICD-10-CM

## 2025-04-10 LAB — HBA1C MFR BLD: 6.6 %

## 2025-04-10 PROCEDURE — 99214 OFFICE O/P EST MOD 30 MIN: CPT | Performed by: NURSE PRACTITIONER

## 2025-04-10 PROCEDURE — 1123F ACP DISCUSS/DSCN MKR DOCD: CPT | Performed by: NURSE PRACTITIONER

## 2025-04-10 PROCEDURE — 83036 HEMOGLOBIN GLYCOSYLATED A1C: CPT | Performed by: NURSE PRACTITIONER

## 2025-04-10 PROCEDURE — 3078F DIAST BP <80 MM HG: CPT | Performed by: NURSE PRACTITIONER

## 2025-04-10 PROCEDURE — 3077F SYST BP >= 140 MM HG: CPT | Performed by: NURSE PRACTITIONER

## 2025-04-10 PROCEDURE — 3044F HG A1C LEVEL LT 7.0%: CPT | Performed by: NURSE PRACTITIONER

## 2025-04-10 RX ORDER — MULTIVIT-MIN/IRON/FOLIC ACID/K 18-600-40
2000 CAPSULE ORAL DAILY
COMMUNITY

## 2025-04-10 ASSESSMENT — PATIENT HEALTH QUESTIONNAIRE - PHQ9
SUM OF ALL RESPONSES TO PHQ QUESTIONS 1-9: 0
9. THOUGHTS THAT YOU WOULD BE BETTER OFF DEAD, OR OF HURTING YOURSELF: NOT AT ALL
6. FEELING BAD ABOUT YOURSELF - OR THAT YOU ARE A FAILURE OR HAVE LET YOURSELF OR YOUR FAMILY DOWN: NOT AT ALL
1. LITTLE INTEREST OR PLEASURE IN DOING THINGS: NOT AT ALL
SUM OF ALL RESPONSES TO PHQ QUESTIONS 1-9: 0
SUM OF ALL RESPONSES TO PHQ QUESTIONS 1-9: 0
8. MOVING OR SPEAKING SO SLOWLY THAT OTHER PEOPLE COULD HAVE NOTICED. OR THE OPPOSITE, BEING SO FIGETY OR RESTLESS THAT YOU HAVE BEEN MOVING AROUND A LOT MORE THAN USUAL: NOT AT ALL
7. TROUBLE CONCENTRATING ON THINGS, SUCH AS READING THE NEWSPAPER OR WATCHING TELEVISION: NOT AT ALL
4. FEELING TIRED OR HAVING LITTLE ENERGY: NOT AT ALL
5. POOR APPETITE OR OVEREATING: NOT AT ALL
2. FEELING DOWN, DEPRESSED OR HOPELESS: NOT AT ALL
SUM OF ALL RESPONSES TO PHQ QUESTIONS 1-9: 0
3. TROUBLE FALLING OR STAYING ASLEEP: NOT AT ALL
10. IF YOU CHECKED OFF ANY PROBLEMS, HOW DIFFICULT HAVE THESE PROBLEMS MADE IT FOR YOU TO DO YOUR WORK, TAKE CARE OF THINGS AT HOME, OR GET ALONG WITH OTHER PEOPLE: NOT DIFFICULT AT ALL

## 2025-04-10 NOTE — PROGRESS NOTES
Ugo Dueñas is a 77 y.o. male who presents to the office today for the following:    Chief Complaint   Patient presents with    Diabetes    Hypertension     Pt did not take his meds today         Past Medical History:   Diagnosis Date    Colon polyps     Diabetes (HCC)     Hypercholesterolemia     Hypertension     Hypokalemia     Hypothyroidism     Kidney stones     Neuropathy     Prostatitis     Psychiatric disorder     depression/anxiety        Past Surgical History:   Procedure Laterality Date    CATARACT REMOVAL       bilateral cataract surgery     COLONOSCOPY N/A 3/18/2022    COLONOSCOPY ([facility]) performed by Rajendra Knapp MD at [facility] ENDOSCOPY    COLONOSCOPY  12/15/2016    no ployps    COLONOSCOPY  11/21/2013    12 polyps    LITHOTRIPSY      kidney stone removal        Family History   Problem Relation Age of Onset    No Known Problems Paternal Grandmother     No Known Problems Paternal Grandfather     No Known Problems Other     Heart Disease Father     Heart Disease Mother     Hypertension Mother     Lung Cancer Father     No Known Problems Maternal Grandmother     No Known Problems Paternal Uncle     No Known Problems Paternal Aunt     No Known Problems Maternal Uncle     No Known Problems Maternal Aunt     No Known Problems Maternal Grandfather         Social History     Socioeconomic History    Marital status:      Spouse name: None    Number of children: None    Years of education: None    Highest education level: None   Tobacco Use    Smoking status: Never    Smokeless tobacco: Never   Vaping Use    Vaping status: Never Used   Substance and Sexual Activity    Alcohol use: Not Currently     Alcohol/week: 2.0 standard drinks of alcohol     Types: 1 Cans of beer, 1 Standard drinks or equivalent per week     Comment: Occassionally per patient.    Drug use: Never     Social Drivers of Health     Financial Resource Strain: Low Risk  (8/1/2023)    Overall Financial Resource Strain (CARDIA)

## 2025-04-11 ENCOUNTER — RESULTS FOLLOW-UP (OUTPATIENT)
Facility: CLINIC | Age: 80
End: 2025-04-11

## 2025-04-11 LAB
25(OH)D3+25(OH)D2 SERPL-MCNC: 37.1 NG/ML (ref 30–100)
ALBUMIN SERPL-MCNC: 4.2 G/DL (ref 3.8–4.8)
ALP SERPL-CCNC: 46 IU/L (ref 44–121)
ALT SERPL-CCNC: 23 IU/L (ref 0–44)
AST SERPL-CCNC: 19 IU/L (ref 0–40)
BASOPHILS # BLD AUTO: 0 X10E3/UL (ref 0–0.2)
BASOPHILS NFR BLD AUTO: 1 %
BILIRUB SERPL-MCNC: 0.8 MG/DL (ref 0–1.2)
BUN SERPL-MCNC: 11 MG/DL (ref 8–27)
BUN/CREAT SERPL: 12 (ref 10–24)
CALCIUM SERPL-MCNC: 9.3 MG/DL (ref 8.6–10.2)
CHLORIDE SERPL-SCNC: 104 MMOL/L (ref 96–106)
CHOLEST SERPL-MCNC: 75 MG/DL (ref 100–199)
CO2 SERPL-SCNC: 23 MMOL/L (ref 20–29)
CREAT SERPL-MCNC: 0.9 MG/DL (ref 0.76–1.27)
EGFRCR SERPLBLD CKD-EPI 2021: 88 ML/MIN/1.73
EOSINOPHIL # BLD AUTO: 0.4 X10E3/UL (ref 0–0.4)
EOSINOPHIL NFR BLD AUTO: 6 %
ERYTHROCYTE [DISTWIDTH] IN BLOOD BY AUTOMATED COUNT: 13.3 % (ref 11.6–15.4)
GLOBULIN SER CALC-MCNC: 2.1 G/DL (ref 1.5–4.5)
GLUCOSE SERPL-MCNC: 104 MG/DL (ref 70–99)
HCT VFR BLD AUTO: 49.9 % (ref 37.5–51)
HDLC SERPL-MCNC: 34 MG/DL
HGB BLD-MCNC: 16.5 G/DL (ref 13–17.7)
IMM GRANULOCYTES # BLD AUTO: 0 X10E3/UL (ref 0–0.1)
IMM GRANULOCYTES NFR BLD AUTO: 0 %
LDLC SERPL CALC-MCNC: 22 MG/DL (ref 0–99)
LYMPHOCYTES # BLD AUTO: 2.1 X10E3/UL (ref 0.7–3.1)
LYMPHOCYTES NFR BLD AUTO: 32 %
MCH RBC QN AUTO: 30.2 PG (ref 26.6–33)
MCHC RBC AUTO-ENTMCNC: 33.1 G/DL (ref 31.5–35.7)
MCV RBC AUTO: 91 FL (ref 79–97)
MONOCYTES # BLD AUTO: 0.4 X10E3/UL (ref 0.1–0.9)
MONOCYTES NFR BLD AUTO: 7 %
NEUTROPHILS # BLD AUTO: 3.5 X10E3/UL (ref 1.4–7)
NEUTROPHILS NFR BLD AUTO: 54 %
PLATELET # BLD AUTO: 133 X10E3/UL (ref 150–450)
POTASSIUM SERPL-SCNC: 4.8 MMOL/L (ref 3.5–5.2)
PROT SERPL-MCNC: 6.3 G/DL (ref 6–8.5)
RBC # BLD AUTO: 5.47 X10E6/UL (ref 4.14–5.8)
SODIUM SERPL-SCNC: 140 MMOL/L (ref 134–144)
SPECIMEN STATUS REPORT: NORMAL
TRIGL SERPL-MCNC: 96 MG/DL (ref 0–149)
TSH SERPL DL<=0.005 MIU/L-ACNC: 2.69 UIU/ML (ref 0.45–4.5)
VLDLC SERPL CALC-MCNC: 19 MG/DL (ref 5–40)
WBC # BLD AUTO: 6.4 X10E3/UL (ref 3.4–10.8)

## 2025-04-12 LAB
APPEARANCE UR: CLEAR
BACTERIA #/AREA URNS HPF: NORMAL /[HPF]
BILIRUB UR QL STRIP: NEGATIVE
CASTS URNS QL MICRO: NORMAL /LPF
COLOR UR: YELLOW
EPI CELLS #/AREA URNS HPF: NORMAL /HPF (ref 0–10)
GLUCOSE UR QL STRIP: ABNORMAL
HGB UR QL STRIP: NEGATIVE
KETONES UR QL STRIP: NEGATIVE
LEUKOCYTE ESTERASE UR QL STRIP: NEGATIVE
MICRO URNS: ABNORMAL
MICRO URNS: ABNORMAL
NITRITE UR QL STRIP: NEGATIVE
PH UR STRIP: 5.5 [PH] (ref 5–7.5)
PROT UR QL STRIP: ABNORMAL
RBC #/AREA URNS HPF: NORMAL /HPF (ref 0–2)
SP GR UR STRIP: 1.03 (ref 1–1.03)
UROBILINOGEN UR STRIP-MCNC: 0.2 MG/DL (ref 0.2–1)
WBC #/AREA URNS HPF: NORMAL /HPF (ref 0–5)

## 2025-04-24 DIAGNOSIS — E78.2 MIXED HYPERLIPIDEMIA: ICD-10-CM

## 2025-04-24 DIAGNOSIS — Z79.4 TYPE 2 DIABETES MELLITUS WITH DIABETIC NEUROPATHY, WITH LONG-TERM CURRENT USE OF INSULIN (HCC): ICD-10-CM

## 2025-04-24 DIAGNOSIS — E11.40 TYPE 2 DIABETES MELLITUS WITH DIABETIC NEUROPATHY, WITH LONG-TERM CURRENT USE OF INSULIN (HCC): ICD-10-CM

## 2025-04-24 DIAGNOSIS — F41.9 ANXIETY AND DEPRESSION: ICD-10-CM

## 2025-04-24 DIAGNOSIS — E03.9 ACQUIRED HYPOTHYROIDISM: ICD-10-CM

## 2025-04-24 DIAGNOSIS — F32.A ANXIETY AND DEPRESSION: ICD-10-CM

## 2025-04-24 RX ORDER — LEVOTHYROXINE SODIUM 50 UG/1
TABLET ORAL
Qty: 90 TABLET | Refills: 1 | Status: SHIPPED | OUTPATIENT
Start: 2025-04-24

## 2025-04-24 RX ORDER — ROSUVASTATIN CALCIUM 20 MG/1
20 TABLET, COATED ORAL DAILY
Qty: 90 TABLET | Refills: 1 | Status: SHIPPED | OUTPATIENT
Start: 2025-04-24

## 2025-04-24 RX ORDER — PEN NEEDLE, DIABETIC 31 GX5/16"
NEEDLE, DISPOSABLE MISCELLANEOUS
Qty: 200 EACH | Refills: 3 | Status: SHIPPED | OUTPATIENT
Start: 2025-04-24

## 2025-04-24 RX ORDER — ESCITALOPRAM OXALATE 20 MG/1
20 TABLET ORAL DAILY
Qty: 90 TABLET | Refills: 1 | Status: SHIPPED | OUTPATIENT
Start: 2025-04-24

## 2025-05-27 RX ORDER — FLUOCINOLONE ACETONIDE 0.11 MG/ML
OIL AURICULAR (OTIC)
Qty: 20 ML | Refills: 1 | OUTPATIENT
Start: 2025-05-27

## 2025-06-02 DIAGNOSIS — Z79.4 TYPE 2 DIABETES MELLITUS WITH DIABETIC NEUROPATHY, WITH LONG-TERM CURRENT USE OF INSULIN (HCC): ICD-10-CM

## 2025-06-02 DIAGNOSIS — E11.40 TYPE 2 DIABETES MELLITUS WITH DIABETIC NEUROPATHY, WITH LONG-TERM CURRENT USE OF INSULIN (HCC): ICD-10-CM

## 2025-06-02 RX ORDER — COLESEVELAM 180 1/1
TABLET ORAL
Qty: 540 TABLET | Refills: 0 | Status: SHIPPED | OUTPATIENT
Start: 2025-06-02

## 2025-06-10 RX ORDER — FLUOCINOLONE ACETONIDE 0.11 MG/ML
OIL AURICULAR (OTIC)
Qty: 20 ML | Refills: 1 | OUTPATIENT
Start: 2025-06-10

## 2025-06-12 ENCOUNTER — TELEPHONE (OUTPATIENT)
Facility: CLINIC | Age: 80
End: 2025-06-12

## 2025-06-12 DIAGNOSIS — E11.40 TYPE 2 DIABETES MELLITUS WITH DIABETIC NEUROPATHY, UNSPECIFIED (HCC): ICD-10-CM

## 2025-07-07 DIAGNOSIS — Z79.4 TYPE 2 DIABETES MELLITUS WITH DIABETIC NEUROPATHY, WITH LONG-TERM CURRENT USE OF INSULIN (HCC): ICD-10-CM

## 2025-07-07 DIAGNOSIS — N20.0 KIDNEY STONES: ICD-10-CM

## 2025-07-07 DIAGNOSIS — E11.40 TYPE 2 DIABETES MELLITUS WITH DIABETIC NEUROPATHY, WITH LONG-TERM CURRENT USE OF INSULIN (HCC): ICD-10-CM

## 2025-07-08 NOTE — TELEPHONE ENCOUNTER
Spoke with patient and he stated he would like to keep the Ozempic like it is because it is working for him and his A1C is below 7 and he has not decided if wants to stay here or go with LIANG Garcia NP. Because we are so nice to him!!.  Thanks for all of your help!! Jessica Bucio LPN.

## 2025-07-08 NOTE — TELEPHONE ENCOUNTER
Previous A1c 6.6%. Treatment plan is not clear. Does he want to continue 0.5mg weekly or increase and maintain at 1mg weekly.  Lyrica is a controlled substance. I will approve 30 days but he will need to be seen by someone for further refills. Virtual visit is ok.

## 2025-07-08 NOTE — TELEPHONE ENCOUNTER
LVM for patient to call our office re: RX requests and virtual visit.  Recommendations also left on VM per RACHAEL Lyons NP.

## 2025-07-09 RX ORDER — SEMAGLUTIDE 0.68 MG/ML
0.5 INJECTION, SOLUTION SUBCUTANEOUS WEEKLY
Qty: 9 ML | Refills: 0 | Status: SHIPPED | OUTPATIENT
Start: 2025-07-09

## 2025-07-09 RX ORDER — PREGABALIN 200 MG/1
CAPSULE ORAL
Qty: 90 CAPSULE | Refills: 0 | Status: SHIPPED | OUTPATIENT
Start: 2025-07-09 | End: 2026-07-09

## 2025-07-23 ENCOUNTER — TELEPHONE (OUTPATIENT)
Age: 80
End: 2025-07-23

## 2025-07-23 RX ORDER — POTASSIUM CITRATE 15 MEQ/1
15 TABLET, EXTENDED RELEASE ORAL DAILY
Qty: 90 TABLET | Refills: 5 | Status: SHIPPED | OUTPATIENT
Start: 2025-07-23

## 2025-07-23 NOTE — TELEPHONE ENCOUNTER
Pt called asking for refill of potassium citrate, he is out and had previously had the pharmacy send request.

## 2025-07-31 RX ORDER — POTASSIUM CITRATE 15 MEQ/1
15 TABLET, EXTENDED RELEASE ORAL 2 TIMES DAILY WITH MEALS
Qty: 180 TABLET | Refills: 3 | Status: SHIPPED | OUTPATIENT
Start: 2025-07-31

## 2025-08-23 ENCOUNTER — HOSPITAL ENCOUNTER (EMERGENCY)
Facility: HOSPITAL | Age: 80
Discharge: HOME | End: 2025-08-23
Attending: EMERGENCY MEDICINE
Payer: MEDICARE

## 2025-08-23 VITALS
DIASTOLIC BLOOD PRESSURE: 59 MMHG | TEMPERATURE: 97.8 F | HEART RATE: 75 BPM | RESPIRATION RATE: 18 BRPM | SYSTOLIC BLOOD PRESSURE: 134 MMHG | HEIGHT: 69 IN | WEIGHT: 235 LBS | OXYGEN SATURATION: 96 % | BODY MASS INDEX: 34.8 KG/M2

## 2025-08-23 DIAGNOSIS — M54.31 SCIATICA OF RIGHT SIDE: ICD-10-CM

## 2025-08-23 DIAGNOSIS — M79.604 RIGHT LEG PAIN: Primary | ICD-10-CM

## 2025-08-23 PROCEDURE — 99284 EMERGENCY DEPT VISIT MOD MDM: CPT

## 2025-08-23 PROCEDURE — 2500000003 HC RX 250 WO HCPCS

## 2025-08-23 PROCEDURE — 6370000000 HC RX 637 (ALT 250 FOR IP): Performed by: EMERGENCY MEDICINE

## 2025-08-23 PROCEDURE — 96372 THER/PROPH/DIAG INJ SC/IM: CPT

## 2025-08-23 PROCEDURE — 6360000002 HC RX W HCPCS: Performed by: EMERGENCY MEDICINE

## 2025-08-23 RX ORDER — METHYLPREDNISOLONE 4 MG/1
TABLET ORAL
Qty: 1 KIT | Refills: 0 | Status: SHIPPED | OUTPATIENT
Start: 2025-08-23

## 2025-08-23 RX ORDER — METHYLPREDNISOLONE SODIUM SUCCINATE 125 MG/2ML
40 INJECTION INTRAMUSCULAR; INTRAVENOUS ONCE
Status: COMPLETED | OUTPATIENT
Start: 2025-08-23 | End: 2025-08-23

## 2025-08-23 RX ORDER — TRAMADOL HYDROCHLORIDE 50 MG/1
50 TABLET ORAL ONCE
Status: COMPLETED | OUTPATIENT
Start: 2025-08-23 | End: 2025-08-23

## 2025-08-23 RX ORDER — TRAMADOL HYDROCHLORIDE 50 MG/1
50 TABLET ORAL EVERY 8 HOURS PRN
Qty: 6 TABLET | Refills: 0 | Status: SHIPPED | OUTPATIENT
Start: 2025-08-23 | End: 2025-08-29

## 2025-08-23 RX ORDER — WATER 10 ML/10ML
INJECTION INTRAMUSCULAR; INTRAVENOUS; SUBCUTANEOUS
Status: COMPLETED
Start: 2025-08-23 | End: 2025-08-23

## 2025-08-23 RX ADMIN — TRAMADOL HYDROCHLORIDE 50 MG: 50 TABLET, FILM COATED ORAL at 11:41

## 2025-08-23 RX ADMIN — WATER 10 ML: 1 INJECTION INTRAMUSCULAR; INTRAVENOUS; SUBCUTANEOUS at 11:43

## 2025-08-23 RX ADMIN — METHYLPREDNISOLONE SODIUM SUCCINATE 40 MG: 125 INJECTION, POWDER, FOR SOLUTION INTRAMUSCULAR; INTRAVENOUS at 11:41

## 2025-08-23 ASSESSMENT — PAIN SCALES - GENERAL
PAINLEVEL_OUTOF10: 7
PAINLEVEL_OUTOF10: 7
PAINLEVEL_OUTOF10: 10
PAINLEVEL_OUTOF10: 7

## 2025-08-23 ASSESSMENT — PAIN - FUNCTIONAL ASSESSMENT
PAIN_FUNCTIONAL_ASSESSMENT: 0-10
PAIN_FUNCTIONAL_ASSESSMENT: 0-10

## 2025-08-23 ASSESSMENT — PAIN DESCRIPTION - ORIENTATION
ORIENTATION: RIGHT
ORIENTATION: RIGHT

## 2025-08-23 ASSESSMENT — PAIN DESCRIPTION - LOCATION
LOCATION: BUTTOCKS;LEG
LOCATION: LEG

## (undated) DEVICE — 1200CC GUARDIAN II: Brand: GUARDIAN

## (undated) DEVICE — WASH CLOTH INCONT LO LINT PREM 12X13 IN LF DISP

## (undated) DEVICE — TUBING, SUCTION, 9/32" X 10', STRAIGHT: Brand: MEDLINE

## (undated) DEVICE — SOLUTION IRRIG 1000ML STRL H2O USP PLAS POUR BTL

## (undated) DEVICE — PAD,PREPPING,CUFFED,24X48,7",NONSTERILE: Brand: MEDLINE

## (undated) DEVICE — GLOVE ORANGE PI 7 1/2   MSG9075

## (undated) DEVICE — JELLY,LUBE,STERILE,FLIP TOP,TUBE,4-OZ: Brand: MEDLINE

## (undated) DEVICE — SPONGE GZ W4XL4IN COT 12 PLY TYP VII WVN C FLD DSGN